# Patient Record
Sex: FEMALE | Race: BLACK OR AFRICAN AMERICAN | NOT HISPANIC OR LATINO
[De-identification: names, ages, dates, MRNs, and addresses within clinical notes are randomized per-mention and may not be internally consistent; named-entity substitution may affect disease eponyms.]

---

## 2017-04-20 ENCOUNTER — APPOINTMENT (OUTPATIENT)
Dept: INTERNAL MEDICINE | Facility: CLINIC | Age: 77
End: 2017-04-20
Payer: MEDICARE

## 2017-04-20 VITALS
OXYGEN SATURATION: 99 % | RESPIRATION RATE: 16 BRPM | HEART RATE: 69 BPM | DIASTOLIC BLOOD PRESSURE: 75 MMHG | TEMPERATURE: 98.4 F | HEIGHT: 63 IN | SYSTOLIC BLOOD PRESSURE: 141 MMHG

## 2017-04-20 PROCEDURE — 99214 OFFICE O/P EST MOD 30 MIN: CPT

## 2017-04-20 RX ORDER — AZITHROMYCIN 250 MG/1
250 TABLET, FILM COATED ORAL
Qty: 1 | Refills: 0 | Status: COMPLETED | COMMUNITY
Start: 2017-04-20 | End: 2017-04-28

## 2017-04-20 RX ORDER — ACETAMINOPHEN AND CODEINE 300; 30 MG/1; MG/1
300-30 TABLET ORAL
Qty: 7 | Refills: 0 | Status: COMPLETED | COMMUNITY
Start: 2016-12-08

## 2017-04-20 RX ORDER — DICLOFENAC SODIUM 75 MG/1
75 TABLET, DELAYED RELEASE ORAL
Qty: 14 | Refills: 0 | Status: COMPLETED | COMMUNITY
Start: 2016-12-08

## 2017-11-16 ENCOUNTER — APPOINTMENT (OUTPATIENT)
Dept: INTERNAL MEDICINE | Facility: CLINIC | Age: 77
End: 2017-11-16

## 2018-02-08 ENCOUNTER — APPOINTMENT (OUTPATIENT)
Dept: INTERNAL MEDICINE | Facility: CLINIC | Age: 78
End: 2018-02-08
Payer: MEDICARE

## 2018-02-08 ENCOUNTER — LABORATORY RESULT (OUTPATIENT)
Age: 78
End: 2018-02-08

## 2018-02-08 VITALS
WEIGHT: 160 LBS | DIASTOLIC BLOOD PRESSURE: 83 MMHG | HEIGHT: 63 IN | HEART RATE: 73 BPM | RESPIRATION RATE: 16 BRPM | TEMPERATURE: 98.2 F | SYSTOLIC BLOOD PRESSURE: 144 MMHG | BODY MASS INDEX: 28.35 KG/M2 | OXYGEN SATURATION: 99 %

## 2018-02-08 DIAGNOSIS — Z87.09 PERSONAL HISTORY OF OTHER DISEASES OF THE RESPIRATORY SYSTEM: ICD-10-CM

## 2018-02-08 DIAGNOSIS — M25.50 PAIN IN UNSPECIFIED JOINT: ICD-10-CM

## 2018-02-08 PROCEDURE — 99397 PER PM REEVAL EST PAT 65+ YR: CPT

## 2018-02-13 LAB
25(OH)D3 SERPL-MCNC: 27.7 NG/ML
ALBUMIN SERPL ELPH-MCNC: 4.3 G/DL
ALP BLD-CCNC: 90 U/L
ALT SERPL-CCNC: 24 U/L
ANION GAP SERPL CALC-SCNC: 16 MMOL/L
APPEARANCE: CLEAR
AST SERPL-CCNC: 21 U/L
BASOPHILS # BLD AUTO: 0.04 K/UL
BASOPHILS NFR BLD AUTO: 0.8 %
BILIRUB SERPL-MCNC: 0.4 MG/DL
BILIRUBIN URINE: NEGATIVE
BLOOD URINE: NEGATIVE
BUN SERPL-MCNC: 15 MG/DL
CALCIUM SERPL-MCNC: 9.6 MG/DL
CHLORIDE SERPL-SCNC: 104 MMOL/L
CHOLEST SERPL-MCNC: 235 MG/DL
CHOLEST/HDLC SERPL: 2.8 RATIO
CO2 SERPL-SCNC: 23 MMOL/L
COLOR: YELLOW
CREAT SERPL-MCNC: 0.71 MG/DL
EOSINOPHIL # BLD AUTO: 0.18 K/UL
EOSINOPHIL NFR BLD AUTO: 3.5 %
GLUCOSE QUALITATIVE U: NEGATIVE MG/DL
GLUCOSE SERPL-MCNC: 82 MG/DL
HBA1C MFR BLD HPLC: 5.2 %
HCT VFR BLD CALC: 46.3 %
HDLC SERPL-MCNC: 83 MG/DL
HGB BLD-MCNC: 14.5 G/DL
IMM GRANULOCYTES NFR BLD AUTO: 0.4 %
KETONES URINE: NEGATIVE
LDLC SERPL CALC-MCNC: 140 MG/DL
LEUKOCYTE ESTERASE URINE: ABNORMAL
LYMPHOCYTES # BLD AUTO: 1.57 K/UL
LYMPHOCYTES NFR BLD AUTO: 30.6 %
MAN DIFF?: NORMAL
MCHC RBC-ENTMCNC: 28 PG
MCHC RBC-ENTMCNC: 31.3 GM/DL
MCV RBC AUTO: 89.4 FL
MONOCYTES # BLD AUTO: 0.51 K/UL
MONOCYTES NFR BLD AUTO: 9.9 %
NEUTROPHILS # BLD AUTO: 2.81 K/UL
NEUTROPHILS NFR BLD AUTO: 54.8 %
NITRITE URINE: NEGATIVE
PH URINE: 8.5
PLATELET # BLD AUTO: 264 K/UL
POTASSIUM SERPL-SCNC: 4.2 MMOL/L
PROT SERPL-MCNC: 7.1 G/DL
PROTEIN URINE: NEGATIVE MG/DL
RBC # BLD: 5.18 M/UL
RBC # FLD: 12.3 %
SODIUM SERPL-SCNC: 143 MMOL/L
SPECIFIC GRAVITY URINE: 1.02
T4 FREE SERPL-MCNC: 1 NG/DL
THYROGLOB AB SERPL-ACNC: <20 IU/ML
THYROPEROXIDASE AB SERPL IA-ACNC: <10 IU/ML
TRIGL SERPL-MCNC: 58 MG/DL
TSH SERPL-ACNC: 4.34 UIU/ML
UROBILINOGEN URINE: NEGATIVE MG/DL
WBC # FLD AUTO: 5.13 K/UL

## 2018-02-28 ENCOUNTER — APPOINTMENT (OUTPATIENT)
Dept: INTERNAL MEDICINE | Facility: CLINIC | Age: 78
End: 2018-02-28
Payer: MEDICARE

## 2018-02-28 VITALS
RESPIRATION RATE: 16 BRPM | HEART RATE: 76 BPM | DIASTOLIC BLOOD PRESSURE: 87 MMHG | TEMPERATURE: 98.6 F | HEIGHT: 63 IN | SYSTOLIC BLOOD PRESSURE: 147 MMHG | OXYGEN SATURATION: 97 %

## 2018-02-28 DIAGNOSIS — Z87.09 PERSONAL HISTORY OF OTHER DISEASES OF THE RESPIRATORY SYSTEM: ICD-10-CM

## 2018-02-28 LAB — RESULT: NEGATIVE

## 2018-02-28 PROCEDURE — 87070 CULTURE OTHR SPECIMN AEROBIC: CPT

## 2018-02-28 PROCEDURE — 99214 OFFICE O/P EST MOD 30 MIN: CPT

## 2018-03-16 ENCOUNTER — APPOINTMENT (OUTPATIENT)
Dept: ORTHOPEDIC SURGERY | Facility: CLINIC | Age: 78
End: 2018-03-16

## 2018-04-20 ENCOUNTER — APPOINTMENT (OUTPATIENT)
Dept: ORTHOPEDIC SURGERY | Facility: CLINIC | Age: 78
End: 2018-04-20
Payer: MEDICARE

## 2018-04-20 VITALS
SYSTOLIC BLOOD PRESSURE: 147 MMHG | DIASTOLIC BLOOD PRESSURE: 100 MMHG | HEIGHT: 63 IN | WEIGHT: 160 LBS | HEART RATE: 69 BPM | BODY MASS INDEX: 28.35 KG/M2

## 2018-04-20 DIAGNOSIS — M17.10 UNILATERAL PRIMARY OSTEOARTHRITIS, UNSPECIFIED KNEE: ICD-10-CM

## 2018-04-20 DIAGNOSIS — Z78.9 OTHER SPECIFIED HEALTH STATUS: ICD-10-CM

## 2018-04-20 DIAGNOSIS — Z87.09 PERSONAL HISTORY OF OTHER DISEASES OF THE RESPIRATORY SYSTEM: ICD-10-CM

## 2018-04-20 DIAGNOSIS — M16.11 UNILATERAL PRIMARY OSTEOARTHRITIS, RIGHT HIP: ICD-10-CM

## 2018-04-20 DIAGNOSIS — M17.11 UNILATERAL PRIMARY OSTEOARTHRITIS, RIGHT KNEE: ICD-10-CM

## 2018-04-20 PROCEDURE — 20610 DRAIN/INJ JOINT/BURSA W/O US: CPT | Mod: RT

## 2018-04-20 PROCEDURE — 99203 OFFICE O/P NEW LOW 30 MIN: CPT | Mod: 25

## 2018-04-20 PROCEDURE — 73564 X-RAY EXAM KNEE 4 OR MORE: CPT | Mod: RT

## 2018-04-20 PROCEDURE — 73502 X-RAY EXAM HIP UNI 2-3 VIEWS: CPT | Mod: RT

## 2018-04-25 ENCOUNTER — APPOINTMENT (OUTPATIENT)
Dept: INTERNAL MEDICINE | Facility: CLINIC | Age: 78
End: 2018-04-25

## 2018-06-27 ENCOUNTER — APPOINTMENT (OUTPATIENT)
Dept: ORTHOPEDIC SURGERY | Facility: CLINIC | Age: 78
End: 2018-06-27
Payer: MEDICARE

## 2018-06-27 VITALS
HEIGHT: 63 IN | BODY MASS INDEX: 28.35 KG/M2 | SYSTOLIC BLOOD PRESSURE: 155 MMHG | DIASTOLIC BLOOD PRESSURE: 75 MMHG | WEIGHT: 160 LBS | HEART RATE: 86 BPM

## 2018-06-27 PROCEDURE — 99213 OFFICE O/P EST LOW 20 MIN: CPT

## 2018-06-28 ENCOUNTER — APPOINTMENT (OUTPATIENT)
Dept: RADIOLOGY | Facility: CLINIC | Age: 78
End: 2018-06-28

## 2018-06-28 ENCOUNTER — APPOINTMENT (OUTPATIENT)
Dept: INTERNAL MEDICINE | Facility: CLINIC | Age: 78
End: 2018-06-28
Payer: MEDICARE

## 2018-06-28 VITALS
OXYGEN SATURATION: 98 % | WEIGHT: 156 LBS | SYSTOLIC BLOOD PRESSURE: 151 MMHG | BODY MASS INDEX: 27.64 KG/M2 | HEART RATE: 75 BPM | RESPIRATION RATE: 16 BRPM | TEMPERATURE: 98.3 F | DIASTOLIC BLOOD PRESSURE: 83 MMHG | HEIGHT: 63 IN

## 2018-06-28 DIAGNOSIS — Z86.69 PERSONAL HISTORY OF OTHER DISEASES OF THE NERVOUS SYSTEM AND SENSE ORGANS: ICD-10-CM

## 2018-06-28 DIAGNOSIS — M79.673 PAIN IN UNSPECIFIED FOOT: ICD-10-CM

## 2018-06-28 DIAGNOSIS — Z87.09 PERSONAL HISTORY OF OTHER DISEASES OF THE RESPIRATORY SYSTEM: ICD-10-CM

## 2018-06-28 DIAGNOSIS — Z87.898 PERSONAL HISTORY OF OTHER SPECIFIED CONDITIONS: ICD-10-CM

## 2018-06-28 PROCEDURE — 99214 OFFICE O/P EST MOD 30 MIN: CPT

## 2018-06-28 RX ORDER — AMOXICILLIN AND CLAVULANATE POTASSIUM 500; 125 MG/1; MG/1
500-125 TABLET, FILM COATED ORAL TWICE DAILY
Qty: 14 | Refills: 0 | Status: DISCONTINUED | COMMUNITY
Start: 2018-02-28 | End: 2018-06-28

## 2018-06-28 RX ORDER — AMOXICILLIN AND CLAVULANATE POTASSIUM 875; 125 MG/1; MG/1
875-125 TABLET, COATED ORAL
Qty: 20 | Refills: 0 | Status: DISCONTINUED | COMMUNITY
Start: 2018-02-27 | End: 2018-06-28

## 2018-07-27 ENCOUNTER — APPOINTMENT (OUTPATIENT)
Dept: ORTHOPEDIC SURGERY | Facility: CLINIC | Age: 78
End: 2018-07-27
Payer: MEDICARE

## 2018-07-27 DIAGNOSIS — M17.0 BILATERAL PRIMARY OSTEOARTHRITIS OF KNEE: ICD-10-CM

## 2018-07-27 PROCEDURE — 99213 OFFICE O/P EST LOW 20 MIN: CPT

## 2018-10-26 ENCOUNTER — APPOINTMENT (OUTPATIENT)
Dept: INTERNAL MEDICINE | Facility: CLINIC | Age: 78
End: 2018-10-26
Payer: MEDICARE

## 2018-10-26 VITALS
HEART RATE: 83 BPM | RESPIRATION RATE: 16 BRPM | DIASTOLIC BLOOD PRESSURE: 77 MMHG | OXYGEN SATURATION: 98 % | WEIGHT: 162 LBS | BODY MASS INDEX: 28.7 KG/M2 | HEIGHT: 63 IN | TEMPERATURE: 98.1 F | SYSTOLIC BLOOD PRESSURE: 139 MMHG

## 2018-10-26 DIAGNOSIS — J45.909 UNSPECIFIED ASTHMA, UNCOMPLICATED: ICD-10-CM

## 2018-10-26 PROCEDURE — 99213 OFFICE O/P EST LOW 20 MIN: CPT

## 2018-10-26 RX ORDER — CELECOXIB 200 MG/1
200 CAPSULE ORAL
Qty: 14 | Refills: 0 | Status: DISCONTINUED | COMMUNITY
Start: 2018-03-20 | End: 2018-10-26

## 2018-11-03 ENCOUNTER — RESULT REVIEW (OUTPATIENT)
Age: 78
End: 2018-11-03

## 2018-11-03 DIAGNOSIS — R94.6 ABNORMAL RESULTS OF THYROID FUNCTION STUDIES: ICD-10-CM

## 2019-02-27 ENCOUNTER — APPOINTMENT (OUTPATIENT)
Dept: UROGYNECOLOGY | Facility: CLINIC | Age: 79
End: 2019-02-27
Payer: MEDICARE

## 2019-02-27 VITALS
DIASTOLIC BLOOD PRESSURE: 90 MMHG | BODY MASS INDEX: 29.44 KG/M2 | SYSTOLIC BLOOD PRESSURE: 150 MMHG | WEIGHT: 160 LBS | HEIGHT: 62 IN

## 2019-02-27 DIAGNOSIS — N32.2 VESICAL FISTULA, NOT ELSEWHERE CLASSIFIED: ICD-10-CM

## 2019-02-27 DIAGNOSIS — R35.0 FREQUENCY OF MICTURITION: ICD-10-CM

## 2019-02-27 DIAGNOSIS — N39.42 INCONTINENCE W/OUT SENSORY AWARENESS: ICD-10-CM

## 2019-02-27 DIAGNOSIS — R26.89 OTHER ABNORMALITIES OF GAIT AND MOBILITY: ICD-10-CM

## 2019-02-27 LAB
BILIRUB UR QL STRIP: NORMAL
CLARITY UR: CLEAR
COLLECTION METHOD: NORMAL
GLUCOSE UR-MCNC: NORMAL
HCG UR QL: 0.2 EU/DL
HGB UR QL STRIP.AUTO: NORMAL
KETONES UR-MCNC: NORMAL
LEUKOCYTE ESTERASE UR QL STRIP: NORMAL
NITRITE UR QL STRIP: NORMAL
PH UR STRIP: 6.5
PROT UR STRIP-MCNC: NORMAL
SP GR UR STRIP: 1.01

## 2019-02-27 PROCEDURE — 51701 INSERT BLADDER CATHETER: CPT

## 2019-02-27 PROCEDURE — 99204 OFFICE O/P NEW MOD 45 MIN: CPT | Mod: 25

## 2019-02-27 NOTE — HISTORY OF PRESENT ILLNESS
[Constipation Obstructed Defecation] : none [Stool Visible Blood] : none [Diarrhea] : none [de-identified] : vaginal spotting, vaginal discharge [FreeTextEntry1] : Patient s/p vaginal hysterectomy for prolapse about 3 years ago had persistent bleeding  which first started 3 months ago. 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 Symptoms are vaginal spotting

## 2019-02-27 NOTE — LETTER BODY
[Dear  ___] : Dear  [unfilled], [Attached please find my note.] : Attached please find my note. [Thank you very much for allowing me to participate in the care of this patient. If you have any questions, please do not hesitate to contact me] : Thank you very much for allowing me to participate in the care of this patient. If you have any questions, please do not hesitate to contact me. [DrJean  ___] : Dr. SWIFT

## 2019-02-27 NOTE — ASSESSMENT
[FreeTextEntry1] : Patient s/p vaginal hysterectomy for prolapse about 3 years ago had persistent bleeding  which first started 3 months ago. 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 Symptoms are vaginal spotting.\par \par Exam notable for 1 cm defect at vaginal apex 9-10 cm proximal to introitus.  Symptoms are only vaginal bleeding although fecal material is apparent.\par \par Defect is too high for local repair.\par \par Plan refer to Dr Hood for assessment of relative need for repair as well as counseling for possible bowel resection and repair. This defect would need to be addressed by colo-reactal surgery.  The only gyn role would be to close the small vaginal defect which typically does not need gyn assistance.

## 2019-02-28 ENCOUNTER — RESULT REVIEW (OUTPATIENT)
Age: 79
End: 2019-02-28

## 2019-02-28 LAB
APPEARANCE: CLEAR
BACTERIA: NEGATIVE
BILIRUBIN URINE: NEGATIVE
BLOOD URINE: NEGATIVE
COLOR: NORMAL
GLUCOSE QUALITATIVE U: NEGATIVE
HYALINE CASTS: 0 /LPF
KETONES URINE: NEGATIVE
LEUKOCYTE ESTERASE URINE: NEGATIVE
MICROSCOPIC-UA: NORMAL
NITRITE URINE: NEGATIVE
PH URINE: 6.5
PROTEIN URINE: NEGATIVE
RED BLOOD CELLS URINE: 1 /HPF
SPECIFIC GRAVITY URINE: 1.01
SQUAMOUS EPITHELIAL CELLS: 2 /HPF
UROBILINOGEN URINE: NORMAL
WHITE BLOOD CELLS URINE: 1 /HPF

## 2019-03-04 ENCOUNTER — RESULT REVIEW (OUTPATIENT)
Age: 79
End: 2019-03-04

## 2019-03-07 ENCOUNTER — APPOINTMENT (OUTPATIENT)
Dept: INTERNAL MEDICINE | Facility: CLINIC | Age: 79
End: 2019-03-07

## 2019-04-05 ENCOUNTER — APPOINTMENT (OUTPATIENT)
Dept: COLORECTAL SURGERY | Facility: CLINIC | Age: 79
End: 2019-04-05
Payer: MEDICARE

## 2019-04-05 VITALS
DIASTOLIC BLOOD PRESSURE: 70 MMHG | HEART RATE: 70 BPM | SYSTOLIC BLOOD PRESSURE: 130 MMHG | HEIGHT: 63 IN | RESPIRATION RATE: 12 BRPM | BODY MASS INDEX: 27.11 KG/M2 | WEIGHT: 153 LBS

## 2019-04-05 PROCEDURE — 99204 OFFICE O/P NEW MOD 45 MIN: CPT

## 2019-04-05 RX ORDER — LEVOTHYROXINE SODIUM 25 UG/1
25 TABLET ORAL DAILY
Qty: 30 | Refills: 3 | Status: DISCONTINUED | COMMUNITY
Start: 2018-11-03 | End: 2019-04-05

## 2019-04-08 NOTE — ASSESSMENT
[FreeTextEntry1] : 78-year-old [facility] female who presents for consultation regarding management of her rectovaginal fistula.\par \par Patient had a vaginal hysterectomy for prolapse approximately 3 years ago. One year ago she started to note the passage of flatus per vagina. In October of 2018 a small amount of blood was passed per vagina. This prompted referral to gynecology. Physical examination revealed what appeared to be a defect at the apex of the vagina and stool in the vagina.  An MRI of the pelvis confirmed the presence of a rectovaginal fistula at approximately the 9-10 cm level. Patient's last colonoscopy was many years ago.\par \par She is otherwise healthy and her only past abdominal surgery includes a tubal ligation through a small lower midline incision.\par \par I had a long discussion with the patient and her daughter who accompanied her regarding my suggestion for the repair of this fistula. This requires an operative approach to take down and repair the rectovaginal fistula with an anterior resection being performed. This will likely be a low colorectal anastomosis and there is a good possibility that she may require a temporary loop ileostomy. We discussed all aspects of both operations including anticipated operative time, hospital stay and time to full recuperation. Risks, alternatives and benefits including but not limited to anastomotic leak, wound infection and deep venous thrombosis were discussed. I also mentioned to her the need for a sigmoidoscopy and a Hypaque enema to assess the colorectal anastomosis prior to loop ileostomy closure. Generally the loop ileostomy is closed 12 weeks after its construction.\par \par Questions were answered and they will get back to md if they wish to proceed with my suggestion.

## 2019-04-08 NOTE — REVIEW OF SYSTEMS
[Joint Pain] : joint pain [Negative] : Endocrine [Chest Pain] : no chest pain [Shortness Of Breath] : no shortness of breath [Easy Bleeding] : no tendency for easy bleeding [Easy Bruising] : no tendency for easy bruising

## 2019-04-08 NOTE — HISTORY OF PRESENT ILLNESS
[FreeTextEntry1] : 77yo Ethiopian female s/p supracervical hysterectomy 3yrs ago secondary to uterine prolapse.\par \par Over the past year pt noticed air via vagina. Since Oct 2018 pt began experiencing "minimal" intermittent vaginal spotting.  Seen by GYN. "Abnormality noted on pelvic exam".  Pelvic MRI performed revealing fistula from vagina to upper rectum.  Also pt with recent UTI. Presents for consultation.\par \par Initial screening colonoscopy @5yrs ago.  Polyps removed.

## 2019-04-08 NOTE — PHYSICAL EXAM
[Normal Breath Sounds] : Normal breath sounds [Normal Heart Sounds] : normal heart sounds [Normal Rate and Rhythm] : normal rate and rhythm [No Edema] : No edema [Alert] : alert [Oriented to Person] : oriented to person [Oriented to Place] : oriented to place [Oriented to Time] : oriented to time [Calm] : calm [de-identified] : obese +BS NT/ND low midline incision [de-identified] : NC/AT [de-identified] : +ROM +kyphosis [de-identified] : intact

## 2019-05-16 ENCOUNTER — MEDICATION RENEWAL (OUTPATIENT)
Age: 79
End: 2019-05-16

## 2019-06-26 ENCOUNTER — FORM ENCOUNTER (OUTPATIENT)
Age: 79
End: 2019-06-26

## 2019-06-27 ENCOUNTER — APPOINTMENT (OUTPATIENT)
Dept: MAMMOGRAPHY | Facility: CLINIC | Age: 79
End: 2019-06-27
Payer: MEDICARE

## 2019-06-27 ENCOUNTER — OUTPATIENT (OUTPATIENT)
Dept: OUTPATIENT SERVICES | Facility: HOSPITAL | Age: 79
LOS: 1 days | End: 2019-06-27
Payer: MEDICARE

## 2019-06-27 DIAGNOSIS — Z00.00 ENCOUNTER FOR GENERAL ADULT MEDICAL EXAMINATION WITHOUT ABNORMAL FINDINGS: ICD-10-CM

## 2019-06-27 PROCEDURE — 77063 BREAST TOMOSYNTHESIS BI: CPT | Mod: 26

## 2019-06-27 PROCEDURE — 77067 SCR MAMMO BI INCL CAD: CPT | Mod: 26

## 2019-06-27 PROCEDURE — 77063 BREAST TOMOSYNTHESIS BI: CPT

## 2019-06-27 PROCEDURE — 77067 SCR MAMMO BI INCL CAD: CPT

## 2019-07-03 ENCOUNTER — MESSAGE (OUTPATIENT)
Age: 79
End: 2019-07-03

## 2019-07-12 ENCOUNTER — MESSAGE (OUTPATIENT)
Age: 79
End: 2019-07-12

## 2019-07-18 ENCOUNTER — RESULT REVIEW (OUTPATIENT)
Age: 79
End: 2019-07-18

## 2019-07-25 ENCOUNTER — APPOINTMENT (OUTPATIENT)
Dept: INTERNAL MEDICINE | Facility: CLINIC | Age: 79
End: 2019-07-25
Payer: MEDICARE

## 2019-07-25 VITALS
BODY MASS INDEX: 27.29 KG/M2 | SYSTOLIC BLOOD PRESSURE: 128 MMHG | HEART RATE: 72 BPM | DIASTOLIC BLOOD PRESSURE: 88 MMHG | RESPIRATION RATE: 15 BRPM | WEIGHT: 154 LBS | OXYGEN SATURATION: 97 % | TEMPERATURE: 98 F | HEIGHT: 63 IN

## 2019-07-25 DIAGNOSIS — Z12.11 ENCOUNTER FOR SCREENING FOR MALIGNANT NEOPLASM OF COLON: ICD-10-CM

## 2019-07-25 DIAGNOSIS — R92.8 OTHER ABNORMAL AND INCONCLUSIVE FINDINGS ON DIAGNOSTIC IMAGING OF BREAST: ICD-10-CM

## 2019-07-25 DIAGNOSIS — S92.911A UNSPECIFIED FRACTURE OF RIGHT TOE(S), INITIAL ENCOUNTER FOR CLOSED FRACTURE: ICD-10-CM

## 2019-07-25 PROCEDURE — 99397 PER PM REEVAL EST PAT 65+ YR: CPT

## 2019-07-25 RX ORDER — SULFAMETHOXAZOLE AND TRIMETHOPRIM 800; 160 MG/1; MG/1
800-160 TABLET ORAL TWICE DAILY
Qty: 6 | Refills: 0 | Status: DISCONTINUED | COMMUNITY
Start: 2019-03-04 | End: 2019-07-25

## 2019-07-30 LAB
25(OH)D3 SERPL-MCNC: 23.1 NG/ML
ALBUMIN SERPL ELPH-MCNC: 4.4 G/DL
ALP BLD-CCNC: 88 U/L
ALT SERPL-CCNC: 15 U/L
ANION GAP SERPL CALC-SCNC: 13 MMOL/L
AST SERPL-CCNC: 19 U/L
BASOPHILS # BLD AUTO: 0.04 K/UL
BASOPHILS NFR BLD AUTO: 0.7 %
BILIRUB SERPL-MCNC: 0.5 MG/DL
BUN SERPL-MCNC: 13 MG/DL
CALCIUM SERPL-MCNC: 9.9 MG/DL
CHLORIDE SERPL-SCNC: 104 MMOL/L
CHOLEST SERPL-MCNC: 216 MG/DL
CHOLEST/HDLC SERPL: 2.8 RATIO
CO2 SERPL-SCNC: 26 MMOL/L
CREAT SERPL-MCNC: 0.66 MG/DL
EOSINOPHIL # BLD AUTO: 0.23 K/UL
EOSINOPHIL NFR BLD AUTO: 3.8 %
ESTIMATED AVERAGE GLUCOSE: 97 MG/DL
GLUCOSE SERPL-MCNC: 76 MG/DL
HBA1C MFR BLD HPLC: 5 %
HCT VFR BLD CALC: 44.3 %
HDLC SERPL-MCNC: 76 MG/DL
HGB BLD-MCNC: 13.4 G/DL
IMM GRANULOCYTES NFR BLD AUTO: 0.2 %
LDLC SERPL CALC-MCNC: 129 MG/DL
LYMPHOCYTES # BLD AUTO: 1.85 K/UL
LYMPHOCYTES NFR BLD AUTO: 30.7 %
MAN DIFF?: NORMAL
MCHC RBC-ENTMCNC: 27.6 PG
MCHC RBC-ENTMCNC: 30.2 GM/DL
MCV RBC AUTO: 91.2 FL
MONOCYTES # BLD AUTO: 0.52 K/UL
MONOCYTES NFR BLD AUTO: 8.6 %
NEUTROPHILS # BLD AUTO: 3.38 K/UL
NEUTROPHILS NFR BLD AUTO: 56 %
PLATELET # BLD AUTO: 268 K/UL
POTASSIUM SERPL-SCNC: 4.5 MMOL/L
PROT SERPL-MCNC: 6.8 G/DL
RBC # BLD: 4.86 M/UL
RBC # FLD: 12.7 %
SODIUM SERPL-SCNC: 143 MMOL/L
T4 FREE SERPL-MCNC: 0.9 NG/DL
TRIGL SERPL-MCNC: 54 MG/DL
TSH SERPL-ACNC: 3.45 UIU/ML
WBC # FLD AUTO: 6.03 K/UL

## 2019-08-14 ENCOUNTER — FORM ENCOUNTER (OUTPATIENT)
Age: 79
End: 2019-08-14

## 2019-08-15 ENCOUNTER — OUTPATIENT (OUTPATIENT)
Dept: OUTPATIENT SERVICES | Facility: HOSPITAL | Age: 79
LOS: 1 days | End: 2019-08-15
Payer: MEDICARE

## 2019-08-15 ENCOUNTER — APPOINTMENT (OUTPATIENT)
Dept: ULTRASOUND IMAGING | Facility: CLINIC | Age: 79
End: 2019-08-15
Payer: MEDICARE

## 2019-08-15 DIAGNOSIS — Z00.8 ENCOUNTER FOR OTHER GENERAL EXAMINATION: ICD-10-CM

## 2019-08-15 PROCEDURE — 76642 ULTRASOUND BREAST LIMITED: CPT

## 2019-08-15 PROCEDURE — 76642 ULTRASOUND BREAST LIMITED: CPT | Mod: 26,LT

## 2019-11-21 ENCOUNTER — APPOINTMENT (OUTPATIENT)
Dept: UROGYNECOLOGY | Facility: CLINIC | Age: 79
End: 2019-11-21
Payer: MEDICARE

## 2019-11-21 DIAGNOSIS — N82.3 FISTULA OF VAGINA TO LARGE INTESTINE: ICD-10-CM

## 2019-11-21 LAB — BACTERIA UR CULT: ABNORMAL

## 2019-11-21 PROCEDURE — 99214 OFFICE O/P EST MOD 30 MIN: CPT

## 2019-11-21 NOTE — HISTORY OF PRESENT ILLNESS
[FreeTextEntry1] : This is a 79-year-old with a documented enterovaginal fistula has already been assessed by Dr. Dorman she known counseled regarding possible surgery including anterior section and necessary repairs with possible colostomy necessary. She came today for my opinion. I did explain to her that the vagina is an outlet for fistulous process which as is his primary tumor surgery on the bowel. The vagina is in active pathway for the stool which is protected for her body. At the conclusion of the procedure Dr. Bhakta she notes a small vaginal and if he felt necessary to have q.n. surgery will of course make our team available.  She is aware that I do not have the expertise to  her regarding expectations for the surgery and she will have further preoperative counseling with Dr. Leal.\par \par \par She does not have any symptoms of a UTI but asked if I could check her urine. As her screw stool at the vagina I cleansed as well as I could but I felt that even with proper technique and a Tesio catheter was more likely to introduce bacteria and help us in obtaining a specimen. He indicated that she has frequency or dysuria he would make sense to have a specimen in the absence of symptoms I thought that catheterization was best not done. He does endorse Dr. bower reputation and surgical scale and helped her to feel reassured that she is in good hands.\par \par Counseling was greater than 50 percent of encounter which included  26   minute face to face time for direct counseling.\par

## 2020-12-16 PROBLEM — Z87.09 HISTORY OF SORE THROAT: Status: RESOLVED | Noted: 2018-02-28 | Resolved: 2020-12-16

## 2021-01-01 ENCOUNTER — APPOINTMENT (OUTPATIENT)
Dept: INTERNAL MEDICINE | Facility: CLINIC | Age: 81
End: 2021-01-01

## 2021-01-14 ENCOUNTER — APPOINTMENT (OUTPATIENT)
Dept: INTERNAL MEDICINE | Facility: CLINIC | Age: 81
End: 2021-01-14
Payer: MEDICARE

## 2021-01-14 VITALS
TEMPERATURE: 98.1 F | DIASTOLIC BLOOD PRESSURE: 82 MMHG | HEIGHT: 63 IN | BODY MASS INDEX: 26.58 KG/M2 | SYSTOLIC BLOOD PRESSURE: 156 MMHG | RESPIRATION RATE: 17 BRPM | WEIGHT: 150 LBS | OXYGEN SATURATION: 100 % | HEART RATE: 88 BPM

## 2021-01-14 DIAGNOSIS — E55.9 VITAMIN D DEFICIENCY, UNSPECIFIED: ICD-10-CM

## 2021-01-14 DIAGNOSIS — E78.5 HYPERLIPIDEMIA, UNSPECIFIED: ICD-10-CM

## 2021-01-14 DIAGNOSIS — Z01.818 ENCOUNTER FOR OTHER PREPROCEDURAL EXAMINATION: ICD-10-CM

## 2021-01-14 DIAGNOSIS — Z00.00 ENCOUNTER FOR GENERAL ADULT MEDICAL EXAMINATION W/OUT ABNORMAL FINDINGS: ICD-10-CM

## 2021-01-14 DIAGNOSIS — Z87.898 PERSONAL HISTORY OF OTHER SPECIFIED CONDITIONS: ICD-10-CM

## 2021-01-14 DIAGNOSIS — R53.83 OTHER FATIGUE: ICD-10-CM

## 2021-01-14 DIAGNOSIS — M54.9 DORSALGIA, UNSPECIFIED: ICD-10-CM

## 2021-01-14 DIAGNOSIS — I10 ESSENTIAL (PRIMARY) HYPERTENSION: ICD-10-CM

## 2021-01-14 DIAGNOSIS — M54.2 CERVICALGIA: ICD-10-CM

## 2021-01-14 DIAGNOSIS — R92.2 INCONCLUSIVE MAMMOGRAM: ICD-10-CM

## 2021-01-14 PROCEDURE — G0439: CPT

## 2021-01-14 PROCEDURE — 99072 ADDL SUPL MATRL&STAF TM PHE: CPT

## 2021-01-14 RX ORDER — ERGOCALCIFEROL 1.25 MG/1
1.25 MG CAPSULE, LIQUID FILLED ORAL
Qty: 12 | Refills: 1 | Status: ACTIVE | COMMUNITY
Start: 2021-01-14 | End: 1900-01-01

## 2021-01-14 RX ORDER — AMLODIPINE BESYLATE 2.5 MG/1
2.5 TABLET ORAL DAILY
Qty: 90 | Refills: 1 | Status: ACTIVE | COMMUNITY
Start: 2021-01-14 | End: 1900-01-01

## 2021-01-21 LAB
25(OH)D3 SERPL-MCNC: 29 NG/ML
ALBUMIN SERPL ELPH-MCNC: 4.4 G/DL
ALP BLD-CCNC: 97 U/L
ALT SERPL-CCNC: 17 U/L
ANION GAP SERPL CALC-SCNC: 13 MMOL/L
AST SERPL-CCNC: 17 U/L
BASOPHILS # BLD AUTO: 0.05 K/UL
BASOPHILS NFR BLD AUTO: 0.7 %
BILIRUB SERPL-MCNC: 0.4 MG/DL
BUN SERPL-MCNC: 16 MG/DL
CALCIUM SERPL-MCNC: 10.3 MG/DL
CHLORIDE SERPL-SCNC: 102 MMOL/L
CHOLEST SERPL-MCNC: 232 MG/DL
CO2 SERPL-SCNC: 26 MMOL/L
CREAT SERPL-MCNC: 0.79 MG/DL
EOSINOPHIL # BLD AUTO: 0.2 K/UL
EOSINOPHIL NFR BLD AUTO: 3 %
ESTIMATED AVERAGE GLUCOSE: 97 MG/DL
GLUCOSE SERPL-MCNC: 83 MG/DL
HBA1C MFR BLD HPLC: 5 %
HCT VFR BLD CALC: 46 %
HDLC SERPL-MCNC: 74 MG/DL
HGB BLD-MCNC: 14.1 G/DL
IMM GRANULOCYTES NFR BLD AUTO: 0.3 %
LDLC SERPL CALC-MCNC: 144 MG/DL
LYMPHOCYTES # BLD AUTO: 1.67 K/UL
LYMPHOCYTES NFR BLD AUTO: 25 %
MAN DIFF?: NORMAL
MCHC RBC-ENTMCNC: 27.9 PG
MCHC RBC-ENTMCNC: 30.7 GM/DL
MCV RBC AUTO: 90.9 FL
MONOCYTES # BLD AUTO: 0.63 K/UL
MONOCYTES NFR BLD AUTO: 9.4 %
NEUTROPHILS # BLD AUTO: 4.11 K/UL
NEUTROPHILS NFR BLD AUTO: 61.6 %
NONHDLC SERPL-MCNC: 158 MG/DL
PLATELET # BLD AUTO: 287 K/UL
POTASSIUM SERPL-SCNC: 4.4 MMOL/L
PROT SERPL-MCNC: 7 G/DL
RBC # BLD: 5.06 M/UL
RBC # FLD: 12 %
SODIUM SERPL-SCNC: 141 MMOL/L
TRIGL SERPL-MCNC: 71 MG/DL
TSH SERPL-ACNC: 5.68 UIU/ML
WBC # FLD AUTO: 6.68 K/UL

## 2022-01-01 ENCOUNTER — INPATIENT (INPATIENT)
Facility: HOSPITAL | Age: 82
LOS: 2 days | Discharge: LEFT BEFORE DISCHARGE | DRG: 299 | End: 2022-02-20
Attending: STUDENT IN AN ORGANIZED HEALTH CARE EDUCATION/TRAINING PROGRAM | Admitting: HOSPITALIST
Payer: MEDICARE

## 2022-01-01 ENCOUNTER — TRANSCRIPTION ENCOUNTER (OUTPATIENT)
Age: 82
End: 2022-01-01

## 2022-01-01 ENCOUNTER — INPATIENT (INPATIENT)
Facility: HOSPITAL | Age: 82
LOS: 0 days | Discharge: EXPIRED | End: 2022-03-13
Attending: STUDENT IN AN ORGANIZED HEALTH CARE EDUCATION/TRAINING PROGRAM | Admitting: STUDENT IN AN ORGANIZED HEALTH CARE EDUCATION/TRAINING PROGRAM
Payer: MEDICARE

## 2022-01-01 VITALS
OXYGEN SATURATION: 100 % | DIASTOLIC BLOOD PRESSURE: 68 MMHG | SYSTOLIC BLOOD PRESSURE: 124 MMHG | HEART RATE: 101 BPM | RESPIRATION RATE: 18 BRPM | TEMPERATURE: 97 F

## 2022-01-01 VITALS
WEIGHT: 139.99 LBS | HEART RATE: 91 BPM | RESPIRATION RATE: 20 BRPM | TEMPERATURE: 97 F | OXYGEN SATURATION: 100 % | HEIGHT: 64 IN | DIASTOLIC BLOOD PRESSURE: 88 MMHG | SYSTOLIC BLOOD PRESSURE: 166 MMHG

## 2022-01-01 VITALS
HEART RATE: 55 BPM | RESPIRATION RATE: 22 BRPM | HEIGHT: 64 IN | DIASTOLIC BLOOD PRESSURE: 55 MMHG | SYSTOLIC BLOOD PRESSURE: 78 MMHG

## 2022-01-01 VITALS — DIASTOLIC BLOOD PRESSURE: 16 MMHG | RESPIRATION RATE: 26 BRPM | SYSTOLIC BLOOD PRESSURE: 38 MMHG | HEART RATE: 58 BPM

## 2022-01-01 DIAGNOSIS — R65.10 SYSTEMIC INFLAMMATORY RESPONSE SYNDROME (SIRS) OF NON-INFECTIOUS ORIGIN WITHOUT ACUTE ORGAN DYSFUNCTION: ICD-10-CM

## 2022-01-01 DIAGNOSIS — Z29.9 ENCOUNTER FOR PROPHYLACTIC MEASURES, UNSPECIFIED: ICD-10-CM

## 2022-01-01 DIAGNOSIS — R00.0 TACHYCARDIA, UNSPECIFIED: ICD-10-CM

## 2022-01-01 DIAGNOSIS — R06.02 SHORTNESS OF BREATH: ICD-10-CM

## 2022-01-01 DIAGNOSIS — R91.8 OTHER NONSPECIFIC ABNORMAL FINDING OF LUNG FIELD: ICD-10-CM

## 2022-01-01 DIAGNOSIS — N17.9 ACUTE KIDNEY FAILURE, UNSPECIFIED: ICD-10-CM

## 2022-01-01 DIAGNOSIS — D64.9 ANEMIA, UNSPECIFIED: ICD-10-CM

## 2022-01-01 DIAGNOSIS — Z90.710 ACQUIRED ABSENCE OF BOTH CERVIX AND UTERUS: Chronic | ICD-10-CM

## 2022-01-01 DIAGNOSIS — M79.89 OTHER SPECIFIED SOFT TISSUE DISORDERS: ICD-10-CM

## 2022-01-01 DIAGNOSIS — N82.3 FISTULA OF VAGINA TO LARGE INTESTINE: ICD-10-CM

## 2022-01-01 DIAGNOSIS — I82.409 ACUTE EMBOLISM AND THROMBOSIS OF UNSPECIFIED DEEP VEINS OF UNSPECIFIED LOWER EXTREMITY: ICD-10-CM

## 2022-01-01 DIAGNOSIS — R79.89 OTHER SPECIFIED ABNORMAL FINDINGS OF BLOOD CHEMISTRY: ICD-10-CM

## 2022-01-01 LAB
ALBUMIN SERPL ELPH-MCNC: 2 G/DL — LOW (ref 3.3–5)
ALBUMIN SERPL ELPH-MCNC: 2.4 G/DL — LOW (ref 3.3–5)
ALBUMIN SERPL ELPH-MCNC: 2.4 G/DL — LOW (ref 3.3–5)
ALBUMIN SERPL ELPH-MCNC: 2.6 G/DL — LOW (ref 3.3–5)
ALBUMIN SERPL ELPH-MCNC: 3 G/DL — LOW (ref 3.3–5)
ALP SERPL-CCNC: 108 U/L — SIGNIFICANT CHANGE UP (ref 40–120)
ALP SERPL-CCNC: 72 U/L — SIGNIFICANT CHANGE UP (ref 40–120)
ALP SERPL-CCNC: 74 U/L — SIGNIFICANT CHANGE UP (ref 40–120)
ALP SERPL-CCNC: 76 U/L — SIGNIFICANT CHANGE UP (ref 40–120)
ALP SERPL-CCNC: 90 U/L — SIGNIFICANT CHANGE UP (ref 40–120)
ALT FLD-CCNC: 28 U/L — SIGNIFICANT CHANGE UP (ref 10–45)
ALT FLD-CCNC: 29 U/L — SIGNIFICANT CHANGE UP (ref 10–45)
ALT FLD-CCNC: 30 U/L — SIGNIFICANT CHANGE UP (ref 10–45)
ALT FLD-CCNC: 32 U/L — SIGNIFICANT CHANGE UP (ref 10–45)
ALT FLD-CCNC: 56 U/L — HIGH (ref 4–33)
ANION GAP SERPL CALC-SCNC: 12 MMOL/L — SIGNIFICANT CHANGE UP (ref 5–17)
ANION GAP SERPL CALC-SCNC: 13 MMOL/L — SIGNIFICANT CHANGE UP (ref 5–17)
ANION GAP SERPL CALC-SCNC: 14 MMOL/L — SIGNIFICANT CHANGE UP (ref 5–17)
ANION GAP SERPL CALC-SCNC: 14 MMOL/L — SIGNIFICANT CHANGE UP (ref 5–17)
ANION GAP SERPL CALC-SCNC: 30 MMOL/L — HIGH (ref 7–14)
ANISOCYTOSIS BLD QL: SLIGHT — SIGNIFICANT CHANGE UP
APTT BLD: 21.9 SEC — LOW (ref 27.5–35.5)
APTT BLD: 39.3 SEC — HIGH (ref 27–36.3)
AST SERPL-CCNC: 30 U/L — SIGNIFICANT CHANGE UP (ref 4–32)
AST SERPL-CCNC: 34 U/L — SIGNIFICANT CHANGE UP (ref 10–40)
AST SERPL-CCNC: 41 U/L — HIGH (ref 10–40)
AST SERPL-CCNC: 45 U/L — HIGH (ref 10–40)
AST SERPL-CCNC: 45 U/L — HIGH (ref 10–40)
B PERT DNA SPEC QL NAA+PROBE: SIGNIFICANT CHANGE UP
B PERT+PARAPERT DNA PNL SPEC NAA+PROBE: SIGNIFICANT CHANGE UP
BASE EXCESS BLDV CALC-SCNC: -0.5 MMOL/L — SIGNIFICANT CHANGE UP (ref -2–2)
BASE EXCESS BLDV CALC-SCNC: -15.5 MMOL/L — LOW (ref -2–3)
BASOPHILS # BLD AUTO: 0 K/UL — SIGNIFICANT CHANGE UP (ref 0–0.2)
BASOPHILS NFR BLD AUTO: 0 % — SIGNIFICANT CHANGE UP (ref 0–2)
BILIRUB SERPL-MCNC: 0.2 MG/DL — SIGNIFICANT CHANGE UP (ref 0.2–1.2)
BILIRUB SERPL-MCNC: 0.3 MG/DL — SIGNIFICANT CHANGE UP (ref 0.2–1.2)
BILIRUB SERPL-MCNC: 0.4 MG/DL — SIGNIFICANT CHANGE UP (ref 0.2–1.2)
BLD GP AB SCN SERPL QL: NEGATIVE — SIGNIFICANT CHANGE UP
BLOOD GAS VENOUS COMPREHENSIVE RESULT: SIGNIFICANT CHANGE UP
BORDETELLA PARAPERTUSSIS (RAPRVP): SIGNIFICANT CHANGE UP
BUN SERPL-MCNC: 110 MG/DL — HIGH (ref 7–23)
BUN SERPL-MCNC: 18 MG/DL — SIGNIFICANT CHANGE UP (ref 7–23)
BUN SERPL-MCNC: 20 MG/DL — SIGNIFICANT CHANGE UP (ref 7–23)
BUN SERPL-MCNC: 22 MG/DL — SIGNIFICANT CHANGE UP (ref 7–23)
BUN SERPL-MCNC: 26 MG/DL — HIGH (ref 7–23)
C PNEUM DNA SPEC QL NAA+PROBE: SIGNIFICANT CHANGE UP
CA-I SERPL-SCNC: 1.18 MMOL/L — SIGNIFICANT CHANGE UP (ref 1.15–1.33)
CA-I SERPL-SCNC: 1.22 MMOL/L — SIGNIFICANT CHANGE UP (ref 1.15–1.33)
CALCIUM SERPL-MCNC: 8.4 MG/DL — SIGNIFICANT CHANGE UP (ref 8.4–10.5)
CALCIUM SERPL-MCNC: 8.4 MG/DL — SIGNIFICANT CHANGE UP (ref 8.4–10.5)
CALCIUM SERPL-MCNC: 8.7 MG/DL — SIGNIFICANT CHANGE UP (ref 8.4–10.5)
CALCIUM SERPL-MCNC: 8.7 MG/DL — SIGNIFICANT CHANGE UP (ref 8.4–10.5)
CALCIUM SERPL-MCNC: 9.3 MG/DL — SIGNIFICANT CHANGE UP (ref 8.4–10.5)
CHLORIDE BLDV-SCNC: 102 MMOL/L — SIGNIFICANT CHANGE UP (ref 96–108)
CHLORIDE BLDV-SCNC: 108 MMOL/L — SIGNIFICANT CHANGE UP (ref 96–108)
CHLORIDE SERPL-SCNC: 101 MMOL/L — SIGNIFICANT CHANGE UP (ref 96–108)
CHLORIDE SERPL-SCNC: 101 MMOL/L — SIGNIFICANT CHANGE UP (ref 96–108)
CHLORIDE SERPL-SCNC: 105 MMOL/L — SIGNIFICANT CHANGE UP (ref 96–108)
CHLORIDE SERPL-SCNC: 98 MMOL/L — SIGNIFICANT CHANGE UP (ref 96–108)
CHLORIDE SERPL-SCNC: 99 MMOL/L — SIGNIFICANT CHANGE UP (ref 98–107)
CO2 BLDV-SCNC: 14.6 MMOL/L — LOW (ref 22–26)
CO2 BLDV-SCNC: 25 MMOL/L — SIGNIFICANT CHANGE UP (ref 22–26)
CO2 SERPL-SCNC: 19 MMOL/L — LOW (ref 22–31)
CO2 SERPL-SCNC: 20 MMOL/L — LOW (ref 22–31)
CO2 SERPL-SCNC: 20 MMOL/L — LOW (ref 22–31)
CO2 SERPL-SCNC: 21 MMOL/L — LOW (ref 22–31)
CO2 SERPL-SCNC: 8 MMOL/L — CRITICAL LOW (ref 22–31)
CREAT SERPL-MCNC: 1.29 MG/DL — SIGNIFICANT CHANGE UP (ref 0.5–1.3)
CREAT SERPL-MCNC: 1.33 MG/DL — HIGH (ref 0.5–1.3)
CREAT SERPL-MCNC: 1.42 MG/DL — HIGH (ref 0.5–1.3)
CREAT SERPL-MCNC: 1.44 MG/DL — HIGH (ref 0.5–1.3)
CREAT SERPL-MCNC: 6.26 MG/DL — HIGH (ref 0.5–1.3)
CULTURE RESULTS: SIGNIFICANT CHANGE UP
CULTURE RESULTS: SIGNIFICANT CHANGE UP
EGFR: 6 ML/MIN/1.73M2 — LOW
ELLIPTOCYTES BLD QL SMEAR: SLIGHT — SIGNIFICANT CHANGE UP
EOSINOPHIL # BLD AUTO: 0 K/UL — SIGNIFICANT CHANGE UP (ref 0–0.5)
EOSINOPHIL # BLD AUTO: 0.34 K/UL — SIGNIFICANT CHANGE UP (ref 0–0.5)
EOSINOPHIL # BLD AUTO: 0.51 K/UL — HIGH (ref 0–0.5)
EOSINOPHIL NFR BLD AUTO: 0 % — SIGNIFICANT CHANGE UP (ref 0–6)
EOSINOPHIL NFR BLD AUTO: 1.7 % — SIGNIFICANT CHANGE UP (ref 0–6)
EOSINOPHIL NFR BLD AUTO: 2.6 % — SIGNIFICANT CHANGE UP (ref 0–6)
FERRITIN SERPL-MCNC: 507 NG/ML — HIGH (ref 15–150)
FLUAV SUBTYP SPEC NAA+PROBE: SIGNIFICANT CHANGE UP
FLUBV RNA SPEC QL NAA+PROBE: SIGNIFICANT CHANGE UP
GAS PNL BLDV: 133 MMOL/L — LOW (ref 136–145)
GAS PNL BLDV: 142 MMOL/L — SIGNIFICANT CHANGE UP (ref 136–145)
GAS PNL BLDV: SIGNIFICANT CHANGE UP
GLUCOSE BLDC GLUCOMTR-MCNC: 208 MG/DL — HIGH (ref 70–99)
GLUCOSE BLDV-MCNC: 117 MG/DL — HIGH (ref 70–99)
GLUCOSE BLDV-MCNC: 181 MG/DL — HIGH (ref 70–99)
GLUCOSE SERPL-MCNC: 127 MG/DL — HIGH (ref 70–99)
GLUCOSE SERPL-MCNC: 129 MG/DL — HIGH (ref 70–99)
GLUCOSE SERPL-MCNC: 69 MG/DL — LOW (ref 70–99)
GLUCOSE SERPL-MCNC: 81 MG/DL — SIGNIFICANT CHANGE UP (ref 70–99)
GLUCOSE SERPL-MCNC: 92 MG/DL — SIGNIFICANT CHANGE UP (ref 70–99)
HADV DNA SPEC QL NAA+PROBE: SIGNIFICANT CHANGE UP
HAPTOGLOB SERPL-MCNC: 337 MG/DL — HIGH (ref 34–200)
HCO3 BLDV-SCNC: 13 MMOL/L — LOW (ref 22–29)
HCO3 BLDV-SCNC: 24 MMOL/L — SIGNIFICANT CHANGE UP (ref 22–29)
HCOV 229E RNA SPEC QL NAA+PROBE: SIGNIFICANT CHANGE UP
HCOV HKU1 RNA SPEC QL NAA+PROBE: SIGNIFICANT CHANGE UP
HCOV NL63 RNA SPEC QL NAA+PROBE: SIGNIFICANT CHANGE UP
HCOV OC43 RNA SPEC QL NAA+PROBE: SIGNIFICANT CHANGE UP
HCT VFR BLD CALC: 17.9 % — CRITICAL LOW (ref 34.5–45)
HCT VFR BLD CALC: 22 % — LOW (ref 34.5–45)
HCT VFR BLD CALC: 23.1 % — LOW (ref 34.5–45)
HCT VFR BLD CALC: 23.8 % — LOW (ref 34.5–45)
HCT VFR BLD CALC: 25.5 % — LOW (ref 34.5–45)
HCT VFR BLDA CALC: 24 % — LOW (ref 34.5–46.5)
HCT VFR BLDA CALC: SIGNIFICANT CHANGE UP % (ref 34.5–46.5)
HGB BLD CALC-MCNC: 7.9 G/DL — LOW (ref 11.7–16.1)
HGB BLD CALC-MCNC: <4 G/DL — CRITICAL LOW (ref 11.5–15.5)
HGB BLD-MCNC: 5.4 G/DL — CRITICAL LOW (ref 11.5–15.5)
HGB BLD-MCNC: 6.7 G/DL — CRITICAL LOW (ref 11.5–15.5)
HGB BLD-MCNC: 6.8 G/DL — CRITICAL LOW (ref 11.5–15.5)
HGB BLD-MCNC: 7.1 G/DL — LOW (ref 11.5–15.5)
HGB BLD-MCNC: 7.8 G/DL — LOW (ref 11.5–15.5)
HMPV RNA SPEC QL NAA+PROBE: SIGNIFICANT CHANGE UP
HPIV1 RNA SPEC QL NAA+PROBE: SIGNIFICANT CHANGE UP
HPIV2 RNA SPEC QL NAA+PROBE: SIGNIFICANT CHANGE UP
HPIV3 RNA SPEC QL NAA+PROBE: SIGNIFICANT CHANGE UP
HPIV4 RNA SPEC QL NAA+PROBE: SIGNIFICANT CHANGE UP
HYPOCHROMIA BLD QL: SIGNIFICANT CHANGE UP
HYPOCHROMIA BLD QL: SLIGHT — SIGNIFICANT CHANGE UP
IANC: 7.6 K/UL — SIGNIFICANT CHANGE UP (ref 1.5–8.5)
INR BLD: 1.38 RATIO — HIGH (ref 0.88–1.16)
INR BLD: 3.71 RATIO — HIGH (ref 0.88–1.16)
IRON SATN MFR SERPL: 11 UG/DL — LOW (ref 30–160)
IRON SATN MFR SERPL: 7 % — LOW (ref 14–50)
LACTATE BLDV-MCNC: 1.3 MMOL/L — SIGNIFICANT CHANGE UP (ref 0.7–2)
LACTATE BLDV-MCNC: 10.4 MMOL/L — CRITICAL HIGH (ref 0.5–2)
LDH SERPL L TO P-CCNC: 226 U/L — SIGNIFICANT CHANGE UP (ref 50–242)
LYMPHOCYTES # BLD AUTO: 0.67 K/UL — LOW (ref 1–3.3)
LYMPHOCYTES # BLD AUTO: 0.73 K/UL — LOW (ref 1–3.3)
LYMPHOCYTES # BLD AUTO: 1.41 K/UL — SIGNIFICANT CHANGE UP (ref 1–3.3)
LYMPHOCYTES # BLD AUTO: 3.4 % — LOW (ref 13–44)
LYMPHOCYTES # BLD AUTO: 7 % — LOW (ref 13–44)
LYMPHOCYTES # BLD AUTO: 8.8 % — LOW (ref 13–44)
M PNEUMO DNA SPEC QL NAA+PROBE: SIGNIFICANT CHANGE UP
MACROCYTES BLD QL: SLIGHT — SIGNIFICANT CHANGE UP
MAGNESIUM SERPL-MCNC: 1.8 MG/DL — SIGNIFICANT CHANGE UP (ref 1.6–2.6)
MAGNESIUM SERPL-MCNC: 1.9 MG/DL — SIGNIFICANT CHANGE UP (ref 1.6–2.6)
MAGNESIUM SERPL-MCNC: 2 MG/DL — SIGNIFICANT CHANGE UP (ref 1.6–2.6)
MANUAL SMEAR VERIFICATION: SIGNIFICANT CHANGE UP
MANUAL SMEAR VERIFICATION: SIGNIFICANT CHANGE UP
MCHC RBC-ENTMCNC: 23.7 PG — LOW (ref 27–34)
MCHC RBC-ENTMCNC: 23.8 PG — LOW (ref 27–34)
MCHC RBC-ENTMCNC: 24.4 PG — LOW (ref 27–34)
MCHC RBC-ENTMCNC: 29.4 GM/DL — LOW (ref 32–36)
MCHC RBC-ENTMCNC: 29.8 GM/DL — LOW (ref 32–36)
MCHC RBC-ENTMCNC: 30.2 GM/DL — LOW (ref 32–36)
MCHC RBC-ENTMCNC: 30.5 GM/DL — LOW (ref 32–36)
MCHC RBC-ENTMCNC: 30.6 GM/DL — LOW (ref 32–36)
MCV RBC AUTO: 77.5 FL — LOW (ref 80–100)
MCV RBC AUTO: 78.3 FL — LOW (ref 80–100)
MCV RBC AUTO: 79.3 FL — LOW (ref 80–100)
MCV RBC AUTO: 80.5 FL — SIGNIFICANT CHANGE UP (ref 80–100)
MCV RBC AUTO: 81 FL — SIGNIFICANT CHANGE UP (ref 80–100)
MONOCYTES # BLD AUTO: 0 K/UL — SIGNIFICANT CHANGE UP (ref 0–0.9)
MONOCYTES # BLD AUTO: 1.54 K/UL — HIGH (ref 0–0.9)
MONOCYTES # BLD AUTO: 1.57 K/UL — HIGH (ref 0–0.9)
MONOCYTES NFR BLD AUTO: 0 % — LOW (ref 2–14)
MONOCYTES NFR BLD AUTO: 7.8 % — SIGNIFICANT CHANGE UP (ref 2–14)
MONOCYTES NFR BLD AUTO: 7.8 % — SIGNIFICANT CHANGE UP (ref 2–14)
MYELOCYTES NFR BLD: 1.7 % — HIGH (ref 0–0)
MYELOCYTES NFR BLD: 1.7 % — HIGH (ref 0–0)
NEUTROPHILS # BLD AUTO: 16.5 K/UL — HIGH (ref 1.8–7.4)
NEUTROPHILS # BLD AUTO: 16.65 K/UL — HIGH (ref 1.8–7.4)
NEUTROPHILS # BLD AUTO: 7.54 K/UL — HIGH (ref 1.8–7.4)
NEUTROPHILS NFR BLD AUTO: 74.5 % — SIGNIFICANT CHANGE UP (ref 43–77)
NEUTROPHILS NFR BLD AUTO: 81.8 % — HIGH (ref 43–77)
NEUTROPHILS NFR BLD AUTO: 83.6 % — HIGH (ref 43–77)
NEUTS BAND # BLD: 0.9 % — SIGNIFICANT CHANGE UP (ref 0–8)
NRBC # BLD: 0 /100 WBCS — SIGNIFICANT CHANGE UP (ref 0–0)
NRBC # BLD: 0 /100 WBCS — SIGNIFICANT CHANGE UP (ref 0–0)
NT-PROBNP SERPL-SCNC: 943 PG/ML — HIGH (ref 0–300)
PCO2 BLDV: 39 MMHG — SIGNIFICANT CHANGE UP (ref 39–42)
PCO2 BLDV: 41 MMHG — SIGNIFICANT CHANGE UP (ref 39–42)
PH BLDV: 7.12 — LOW (ref 7.32–7.43)
PH BLDV: 7.4 — SIGNIFICANT CHANGE UP (ref 7.32–7.43)
PHOSPHATE SERPL-MCNC: 3 MG/DL — SIGNIFICANT CHANGE UP (ref 2.5–4.5)
PHOSPHATE SERPL-MCNC: 4 MG/DL — SIGNIFICANT CHANGE UP (ref 2.5–4.5)
PHOSPHATE SERPL-MCNC: 4.3 MG/DL — SIGNIFICANT CHANGE UP (ref 2.5–4.5)
PLAT MORPH BLD: NORMAL — SIGNIFICANT CHANGE UP
PLAT MORPH BLD: NORMAL — SIGNIFICANT CHANGE UP
PLATELET # BLD AUTO: 427 K/UL — HIGH (ref 150–400)
PLATELET # BLD AUTO: 565 K/UL — HIGH (ref 150–400)
PLATELET # BLD AUTO: 601 K/UL — HIGH (ref 150–400)
PLATELET # BLD AUTO: 622 K/UL — HIGH (ref 150–400)
PLATELET # BLD AUTO: 653 K/UL — HIGH (ref 150–400)
PO2 BLDV: 18 MMHG — LOW (ref 25–45)
PO2 BLDV: <20 MMHG — SIGNIFICANT CHANGE UP
POIKILOCYTOSIS BLD QL AUTO: SLIGHT — SIGNIFICANT CHANGE UP
POIKILOCYTOSIS BLD QL AUTO: SLIGHT — SIGNIFICANT CHANGE UP
POLYCHROMASIA BLD QL SMEAR: SLIGHT — SIGNIFICANT CHANGE UP
POLYCHROMASIA BLD QL SMEAR: SLIGHT — SIGNIFICANT CHANGE UP
POTASSIUM BLDV-SCNC: 4.5 MMOL/L — SIGNIFICANT CHANGE UP (ref 3.5–5.1)
POTASSIUM BLDV-SCNC: 6 MMOL/L — HIGH (ref 3.5–5.1)
POTASSIUM SERPL-MCNC: 4.5 MMOL/L — SIGNIFICANT CHANGE UP (ref 3.5–5.3)
POTASSIUM SERPL-MCNC: 4.9 MMOL/L — SIGNIFICANT CHANGE UP (ref 3.5–5.3)
POTASSIUM SERPL-MCNC: 5.1 MMOL/L — SIGNIFICANT CHANGE UP (ref 3.5–5.3)
POTASSIUM SERPL-MCNC: 5.2 MMOL/L — SIGNIFICANT CHANGE UP (ref 3.5–5.3)
POTASSIUM SERPL-MCNC: 7.1 MMOL/L — CRITICAL HIGH (ref 3.5–5.3)
POTASSIUM SERPL-SCNC: 4.5 MMOL/L — SIGNIFICANT CHANGE UP (ref 3.5–5.3)
POTASSIUM SERPL-SCNC: 4.9 MMOL/L — SIGNIFICANT CHANGE UP (ref 3.5–5.3)
POTASSIUM SERPL-SCNC: 5.1 MMOL/L — SIGNIFICANT CHANGE UP (ref 3.5–5.3)
POTASSIUM SERPL-SCNC: 5.2 MMOL/L — SIGNIFICANT CHANGE UP (ref 3.5–5.3)
POTASSIUM SERPL-SCNC: 7.1 MMOL/L — CRITICAL HIGH (ref 3.5–5.3)
PROT SERPL-MCNC: 5.8 G/DL — LOW (ref 6–8.3)
PROT SERPL-MCNC: 6.3 G/DL — SIGNIFICANT CHANGE UP (ref 6–8.3)
PROT SERPL-MCNC: 6.4 G/DL — SIGNIFICANT CHANGE UP (ref 6–8.3)
PROT SERPL-MCNC: 6.5 G/DL — SIGNIFICANT CHANGE UP (ref 6–8.3)
PROT SERPL-MCNC: 7.5 G/DL — SIGNIFICANT CHANGE UP (ref 6–8.3)
PROTHROM AB SERPL-ACNC: 16.3 SEC — HIGH (ref 10.6–13.6)
PROTHROM AB SERPL-ACNC: 43.6 SEC — HIGH (ref 10.5–13.4)
RAPID RVP RESULT: SIGNIFICANT CHANGE UP
RAPID RVP RESULT: SIGNIFICANT CHANGE UP
RBC # BLD: 2.21 M/UL — LOW (ref 3.8–5.2)
RBC # BLD: 2.81 M/UL — LOW (ref 3.8–5.2)
RBC # BLD: 2.87 M/UL — LOW (ref 3.8–5.2)
RBC # BLD: 3 M/UL — LOW (ref 3.8–5.2)
RBC # BLD: 3.29 M/UL — LOW (ref 3.8–5.2)
RBC # FLD: 16.6 % — HIGH (ref 10.3–14.5)
RBC # FLD: 16.9 % — HIGH (ref 10.3–14.5)
RBC # FLD: 17.2 % — HIGH (ref 10.3–14.5)
RBC # FLD: 17.5 % — HIGH (ref 10.3–14.5)
RBC # FLD: 22.5 % — HIGH (ref 10.3–14.5)
RBC BLD AUTO: ABNORMAL
RBC BLD AUTO: ABNORMAL
RH IG SCN BLD-IMP: POSITIVE — SIGNIFICANT CHANGE UP
RSV RNA SPEC QL NAA+PROBE: SIGNIFICANT CHANGE UP
RV+EV RNA SPEC QL NAA+PROBE: SIGNIFICANT CHANGE UP
SAO2 % BLDV: 28.6 % — LOW (ref 67–88)
SAO2 % BLDV: 82.5 % — SIGNIFICANT CHANGE UP
SARS-COV-2 RNA SPEC QL NAA+PROBE: SIGNIFICANT CHANGE UP
SARS-COV-2 RNA SPEC QL NAA+PROBE: SIGNIFICANT CHANGE UP
SCHISTOCYTES BLD QL AUTO: SLIGHT — SIGNIFICANT CHANGE UP
SODIUM SERPL-SCNC: 132 MMOL/L — LOW (ref 135–145)
SODIUM SERPL-SCNC: 133 MMOL/L — LOW (ref 135–145)
SODIUM SERPL-SCNC: 136 MMOL/L — SIGNIFICANT CHANGE UP (ref 135–145)
SODIUM SERPL-SCNC: 137 MMOL/L — SIGNIFICANT CHANGE UP (ref 135–145)
SODIUM SERPL-SCNC: 137 MMOL/L — SIGNIFICANT CHANGE UP (ref 135–145)
SPECIMEN SOURCE: SIGNIFICANT CHANGE UP
SPECIMEN SOURCE: SIGNIFICANT CHANGE UP
T3 SERPL-MCNC: 56 NG/DL — LOW (ref 80–200)
T4 AB SER-ACNC: 5 UG/DL — SIGNIFICANT CHANGE UP (ref 4.6–12)
T4 FREE SERPL-MCNC: 1.1 NG/DL — SIGNIFICANT CHANGE UP (ref 0.9–1.8)
TARGETS BLD QL SMEAR: SIGNIFICANT CHANGE UP
TARGETS BLD QL SMEAR: SLIGHT — SIGNIFICANT CHANGE UP
TIBC SERPL-MCNC: 160 UG/DL — LOW (ref 220–430)
TSH SERPL-MCNC: 6.2 UIU/ML — HIGH (ref 0.27–4.2)
TSH SERPL-MCNC: 6.98 UIU/ML — HIGH (ref 0.27–4.2)
TSH SERPL-MCNC: 9.1 UIU/ML — HIGH (ref 0.27–4.2)
UIBC SERPL-MCNC: 149 UG/DL — SIGNIFICANT CHANGE UP (ref 110–370)
WBC # BLD: 19.71 K/UL — HIGH (ref 3.8–10.5)
WBC # BLD: 20.17 K/UL — HIGH (ref 3.8–10.5)
WBC # BLD: 22.14 K/UL — HIGH (ref 3.8–10.5)
WBC # BLD: 22.66 K/UL — HIGH (ref 3.8–10.5)
WBC # BLD: 8.35 K/UL — SIGNIFICANT CHANGE UP (ref 3.8–10.5)
WBC # FLD AUTO: 19.71 K/UL — HIGH (ref 3.8–10.5)
WBC # FLD AUTO: 20.17 K/UL — HIGH (ref 3.8–10.5)
WBC # FLD AUTO: 22.14 K/UL — HIGH (ref 3.8–10.5)
WBC # FLD AUTO: 22.66 K/UL — HIGH (ref 3.8–10.5)
WBC # FLD AUTO: 8.35 K/UL — SIGNIFICANT CHANGE UP (ref 3.8–10.5)

## 2022-01-01 PROCEDURE — 74176 CT ABD & PELVIS W/O CONTRAST: CPT | Mod: MA

## 2022-01-01 PROCEDURE — 99223 1ST HOSP IP/OBS HIGH 75: CPT | Mod: GC

## 2022-01-01 PROCEDURE — 31500 INSERT EMERGENCY AIRWAY: CPT

## 2022-01-01 PROCEDURE — 84480 ASSAY TRIIODOTHYRONINE (T3): CPT

## 2022-01-01 PROCEDURE — 99233 SBSQ HOSP IP/OBS HIGH 50: CPT | Mod: GC

## 2022-01-01 PROCEDURE — 83735 ASSAY OF MAGNESIUM: CPT

## 2022-01-01 PROCEDURE — 93970 EXTREMITY STUDY: CPT | Mod: 26

## 2022-01-01 PROCEDURE — 84439 ASSAY OF FREE THYROXINE: CPT

## 2022-01-01 PROCEDURE — 82803 BLOOD GASES ANY COMBINATION: CPT

## 2022-01-01 PROCEDURE — 82330 ASSAY OF CALCIUM: CPT

## 2022-01-01 PROCEDURE — 84100 ASSAY OF PHOSPHORUS: CPT

## 2022-01-01 PROCEDURE — 84443 ASSAY THYROID STIM HORMONE: CPT

## 2022-01-01 PROCEDURE — 80053 COMPREHEN METABOLIC PANEL: CPT

## 2022-01-01 PROCEDURE — 71250 CT THORAX DX C-: CPT | Mod: 26,MA

## 2022-01-01 PROCEDURE — 0225U NFCT DS DNA&RNA 21 SARSCOV2: CPT

## 2022-01-01 PROCEDURE — 86850 RBC ANTIBODY SCREEN: CPT

## 2022-01-01 PROCEDURE — 71250 CT THORAX DX C-: CPT | Mod: MA

## 2022-01-01 PROCEDURE — 99285 EMERGENCY DEPT VISIT HI MDM: CPT | Mod: 25

## 2022-01-01 PROCEDURE — 71045 X-RAY EXAM CHEST 1 VIEW: CPT

## 2022-01-01 PROCEDURE — 84484 ASSAY OF TROPONIN QUANT: CPT

## 2022-01-01 PROCEDURE — 83615 LACTATE (LD) (LDH) ENZYME: CPT

## 2022-01-01 PROCEDURE — 86901 BLOOD TYPING SEROLOGIC RH(D): CPT

## 2022-01-01 PROCEDURE — 83550 IRON BINDING TEST: CPT

## 2022-01-01 PROCEDURE — 71045 X-RAY EXAM CHEST 1 VIEW: CPT | Mod: 26

## 2022-01-01 PROCEDURE — 83010 ASSAY OF HAPTOGLOBIN QUANT: CPT

## 2022-01-01 PROCEDURE — 83540 ASSAY OF IRON: CPT

## 2022-01-01 PROCEDURE — 83880 ASSAY OF NATRIURETIC PEPTIDE: CPT

## 2022-01-01 PROCEDURE — 93970 EXTREMITY STUDY: CPT

## 2022-01-01 PROCEDURE — 84132 ASSAY OF SERUM POTASSIUM: CPT

## 2022-01-01 PROCEDURE — 99291 CRITICAL CARE FIRST HOUR: CPT

## 2022-01-01 PROCEDURE — 84295 ASSAY OF SERUM SODIUM: CPT

## 2022-01-01 PROCEDURE — 85730 THROMBOPLASTIN TIME PARTIAL: CPT

## 2022-01-01 PROCEDURE — 36415 COLL VENOUS BLD VENIPUNCTURE: CPT

## 2022-01-01 PROCEDURE — 87040 BLOOD CULTURE FOR BACTERIA: CPT

## 2022-01-01 PROCEDURE — 86900 BLOOD TYPING SEROLOGIC ABO: CPT

## 2022-01-01 PROCEDURE — 99291 CRITICAL CARE FIRST HOUR: CPT | Mod: GC

## 2022-01-01 PROCEDURE — 82435 ASSAY OF BLOOD CHLORIDE: CPT

## 2022-01-01 PROCEDURE — 93005 ELECTROCARDIOGRAM TRACING: CPT

## 2022-01-01 PROCEDURE — 85610 PROTHROMBIN TIME: CPT

## 2022-01-01 PROCEDURE — 85025 COMPLETE CBC W/AUTO DIFF WBC: CPT

## 2022-01-01 PROCEDURE — 84436 ASSAY OF TOTAL THYROXINE: CPT

## 2022-01-01 PROCEDURE — 85014 HEMATOCRIT: CPT

## 2022-01-01 PROCEDURE — 96374 THER/PROPH/DIAG INJ IV PUSH: CPT

## 2022-01-01 PROCEDURE — 99221 1ST HOSP IP/OBS SF/LOW 40: CPT | Mod: GC

## 2022-01-01 PROCEDURE — 74176 CT ABD & PELVIS W/O CONTRAST: CPT | Mod: 26,MA

## 2022-01-01 PROCEDURE — 85018 HEMOGLOBIN: CPT

## 2022-01-01 PROCEDURE — 83605 ASSAY OF LACTIC ACID: CPT

## 2022-01-01 PROCEDURE — 85027 COMPLETE CBC AUTOMATED: CPT

## 2022-01-01 PROCEDURE — 82728 ASSAY OF FERRITIN: CPT

## 2022-01-01 PROCEDURE — 99291 CRITICAL CARE FIRST HOUR: CPT | Mod: 25

## 2022-01-01 PROCEDURE — 99239 HOSP IP/OBS DSCHRG MGMT >30: CPT | Mod: GC

## 2022-01-01 PROCEDURE — 99223 1ST HOSP IP/OBS HIGH 75: CPT

## 2022-01-01 PROCEDURE — 82947 ASSAY GLUCOSE BLOOD QUANT: CPT

## 2022-01-01 RX ORDER — SODIUM CHLORIDE 9 MG/ML
500 INJECTION, SOLUTION INTRAVENOUS
Refills: 0 | Status: DISCONTINUED | OUTPATIENT
Start: 2022-01-01 | End: 2022-01-01

## 2022-01-01 RX ORDER — ACETAMINOPHEN 500 MG
1000 TABLET ORAL ONCE
Refills: 0 | Status: COMPLETED | OUTPATIENT
Start: 2022-01-01 | End: 2022-01-01

## 2022-01-01 RX ORDER — PANTOPRAZOLE SODIUM 20 MG/1
80 TABLET, DELAYED RELEASE ORAL ONCE
Refills: 0 | Status: COMPLETED | OUTPATIENT
Start: 2022-01-01 | End: 2022-01-01

## 2022-01-01 RX ORDER — SODIUM BICARBONATE 1 MEQ/ML
0.21 SYRINGE (ML) INTRAVENOUS
Qty: 150 | Refills: 0 | Status: DISCONTINUED | OUTPATIENT
Start: 2022-01-01 | End: 2022-01-01

## 2022-01-01 RX ORDER — EPINEPHRINE 0.3 MG/.3ML
0.5 INJECTION INTRAMUSCULAR; SUBCUTANEOUS ONCE
Refills: 0 | Status: COMPLETED | OUTPATIENT
Start: 2022-01-01 | End: 2022-01-01

## 2022-01-01 RX ORDER — PROPOFOL 10 MG/ML
10 INJECTION, EMULSION INTRAVENOUS
Qty: 1000 | Refills: 0 | Status: DISCONTINUED | OUTPATIENT
Start: 2022-01-01 | End: 2022-01-01

## 2022-01-01 RX ORDER — SODIUM CHLORIDE 9 MG/ML
2000 INJECTION INTRAMUSCULAR; INTRAVENOUS; SUBCUTANEOUS ONCE
Refills: 0 | Status: COMPLETED | OUTPATIENT
Start: 2022-01-01 | End: 2022-01-01

## 2022-01-01 RX ORDER — IRON SUCROSE 20 MG/ML
200 INJECTION, SOLUTION INTRAVENOUS EVERY 24 HOURS
Refills: 0 | Status: DISCONTINUED | OUTPATIENT
Start: 2022-01-01 | End: 2022-01-01

## 2022-01-01 RX ORDER — HYDROCORTISONE 20 MG
100 TABLET ORAL ONCE
Refills: 0 | Status: COMPLETED | OUTPATIENT
Start: 2022-01-01 | End: 2022-01-01

## 2022-01-01 RX ORDER — ASPIRIN/CALCIUM CARB/MAGNESIUM 324 MG
0 TABLET ORAL
Qty: 0 | Refills: 0 | DISCHARGE

## 2022-01-01 RX ORDER — SODIUM CHLORIDE 9 MG/ML
1000 INJECTION INTRAMUSCULAR; INTRAVENOUS; SUBCUTANEOUS ONCE
Refills: 0 | Status: COMPLETED | OUTPATIENT
Start: 2022-01-01 | End: 2022-01-01

## 2022-01-01 RX ORDER — VANCOMYCIN HCL 1 G
1000 VIAL (EA) INTRAVENOUS ONCE
Refills: 0 | Status: COMPLETED | OUTPATIENT
Start: 2022-01-01 | End: 2022-01-01

## 2022-01-01 RX ORDER — PIPERACILLIN AND TAZOBACTAM 4; .5 G/20ML; G/20ML
3.38 INJECTION, POWDER, LYOPHILIZED, FOR SOLUTION INTRAVENOUS ONCE
Refills: 0 | Status: COMPLETED | OUTPATIENT
Start: 2022-01-01 | End: 2022-01-01

## 2022-01-01 RX ORDER — HYDROCORTISONE 20 MG
500 TABLET ORAL ONCE
Refills: 0 | Status: DISCONTINUED | OUTPATIENT
Start: 2022-01-01 | End: 2022-01-01

## 2022-01-01 RX ORDER — APIXABAN 2.5 MG/1
1 TABLET, FILM COATED ORAL
Qty: 180 | Refills: 0
Start: 2022-01-01 | End: 2022-05-27

## 2022-01-01 RX ORDER — CIPROFLOXACIN LACTATE 400MG/40ML
1 VIAL (ML) INTRAVENOUS
Qty: 8 | Refills: 0
Start: 2022-01-01 | End: 2022-01-01

## 2022-01-01 RX ORDER — APIXABAN 2.5 MG/1
2 TABLET, FILM COATED ORAL
Qty: 28 | Refills: 0
Start: 2022-01-01 | End: 2022-01-01

## 2022-01-01 RX ORDER — INSULIN HUMAN 100 [IU]/ML
5 INJECTION, SOLUTION SUBCUTANEOUS ONCE
Refills: 0 | Status: COMPLETED | OUTPATIENT
Start: 2022-01-01 | End: 2022-01-01

## 2022-01-01 RX ORDER — PIPERACILLIN AND TAZOBACTAM 4; .5 G/20ML; G/20ML
3.38 INJECTION, POWDER, LYOPHILIZED, FOR SOLUTION INTRAVENOUS EVERY 8 HOURS
Refills: 0 | Status: DISCONTINUED | OUTPATIENT
Start: 2022-01-01 | End: 2022-01-01

## 2022-01-01 RX ORDER — NOREPINEPHRINE BITARTRATE/D5W 8 MG/250ML
0.05 PLASTIC BAG, INJECTION (ML) INTRAVENOUS
Qty: 8 | Refills: 0 | Status: DISCONTINUED | OUTPATIENT
Start: 2022-01-01 | End: 2022-01-01

## 2022-01-01 RX ORDER — SODIUM BICARBONATE 1 MEQ/ML
50 SYRINGE (ML) INTRAVENOUS ONCE
Refills: 0 | Status: COMPLETED | OUTPATIENT
Start: 2022-01-01 | End: 2022-01-01

## 2022-01-01 RX ORDER — CHLORHEXIDINE GLUCONATE 213 G/1000ML
15 SOLUTION TOPICAL EVERY 12 HOURS
Refills: 0 | Status: DISCONTINUED | OUTPATIENT
Start: 2022-01-01 | End: 2022-01-01

## 2022-01-01 RX ORDER — IRON SUCROSE 20 MG/ML
100 INJECTION, SOLUTION INTRAVENOUS EVERY 24 HOURS
Refills: 0 | Status: DISCONTINUED | OUTPATIENT
Start: 2022-01-01 | End: 2022-01-01

## 2022-01-01 RX ORDER — HYDROMORPHONE HYDROCHLORIDE 2 MG/ML
0.5 INJECTION INTRAMUSCULAR; INTRAVENOUS; SUBCUTANEOUS ONCE
Refills: 0 | Status: DISCONTINUED | OUTPATIENT
Start: 2022-01-01 | End: 2022-01-01

## 2022-01-01 RX ORDER — EPINEPHRINE 0.3 MG/.3ML
1 INJECTION INTRAMUSCULAR; SUBCUTANEOUS ONCE
Refills: 0 | Status: DISCONTINUED | OUTPATIENT
Start: 2022-01-01 | End: 2022-01-01

## 2022-01-01 RX ORDER — FERROUS SULFATE 325(65) MG
1 TABLET ORAL
Qty: 30 | Refills: 0
Start: 2022-01-01 | End: 2022-03-21

## 2022-01-01 RX ORDER — HEPARIN SODIUM 5000 [USP'U]/ML
5000 INJECTION INTRAVENOUS; SUBCUTANEOUS EVERY 8 HOURS
Refills: 0 | Status: DISCONTINUED | OUTPATIENT
Start: 2022-01-01 | End: 2022-01-01

## 2022-01-01 RX ORDER — DEXTROSE 50 % IN WATER 50 %
50 SYRINGE (ML) INTRAVENOUS ONCE
Refills: 0 | Status: COMPLETED | OUTPATIENT
Start: 2022-01-01 | End: 2022-01-01

## 2022-01-01 RX ORDER — ENOXAPARIN SODIUM 100 MG/ML
64 INJECTION SUBCUTANEOUS ONCE
Refills: 0 | Status: DISCONTINUED | OUTPATIENT
Start: 2022-01-01 | End: 2022-01-01

## 2022-01-01 RX ORDER — CHLORHEXIDINE GLUCONATE 213 G/1000ML
1 SOLUTION TOPICAL
Refills: 0 | Status: DISCONTINUED | OUTPATIENT
Start: 2022-01-01 | End: 2022-01-01

## 2022-01-01 RX ADMIN — PIPERACILLIN AND TAZOBACTAM 200 GRAM(S): 4; .5 INJECTION, POWDER, LYOPHILIZED, FOR SOLUTION INTRAVENOUS at 17:37

## 2022-01-01 RX ADMIN — IRON SUCROSE 220 MILLIGRAM(S): 20 INJECTION, SOLUTION INTRAVENOUS at 16:35

## 2022-01-01 RX ADMIN — Medication 400 MILLIGRAM(S): at 20:40

## 2022-01-01 RX ADMIN — INSULIN HUMAN 5 UNIT(S): 100 INJECTION, SOLUTION SUBCUTANEOUS at 20:57

## 2022-01-01 RX ADMIN — Medication 50 MILLIEQUIVALENT(S): at 18:07

## 2022-01-01 RX ADMIN — Medication 50 MILLILITER(S): at 20:56

## 2022-01-01 RX ADMIN — IRON SUCROSE 220 MILLIGRAM(S): 20 INJECTION, SOLUTION INTRAVENOUS at 16:50

## 2022-01-01 RX ADMIN — HYDROMORPHONE HYDROCHLORIDE 0.5 MILLIGRAM(S): 2 INJECTION INTRAMUSCULAR; INTRAVENOUS; SUBCUTANEOUS at 23:07

## 2022-01-01 RX ADMIN — PIPERACILLIN AND TAZOBACTAM 25 GRAM(S): 4; .5 INJECTION, POWDER, LYOPHILIZED, FOR SOLUTION INTRAVENOUS at 05:23

## 2022-01-01 RX ADMIN — PIPERACILLIN AND TAZOBACTAM 25 GRAM(S): 4; .5 INJECTION, POWDER, LYOPHILIZED, FOR SOLUTION INTRAVENOUS at 11:11

## 2022-01-01 RX ADMIN — Medication 6.56 MICROGRAM(S)/KG/MIN: at 17:49

## 2022-01-01 RX ADMIN — Medication 100 MEQ/KG/HR: at 20:56

## 2022-01-01 RX ADMIN — PIPERACILLIN AND TAZOBACTAM 200 GRAM(S): 4; .5 INJECTION, POWDER, LYOPHILIZED, FOR SOLUTION INTRAVENOUS at 07:50

## 2022-01-01 RX ADMIN — SODIUM CHLORIDE 1000 MILLILITER(S): 9 INJECTION INTRAMUSCULAR; INTRAVENOUS; SUBCUTANEOUS at 20:40

## 2022-01-01 RX ADMIN — Medication 100 MILLIGRAM(S): at 18:45

## 2022-01-01 RX ADMIN — Medication 100 MILLIGRAM(S): at 18:06

## 2022-01-01 RX ADMIN — SODIUM CHLORIDE 2000 MILLILITER(S): 9 INJECTION INTRAMUSCULAR; INTRAVENOUS; SUBCUTANEOUS at 17:41

## 2022-01-01 RX ADMIN — PIPERACILLIN AND TAZOBACTAM 25 GRAM(S): 4; .5 INJECTION, POWDER, LYOPHILIZED, FOR SOLUTION INTRAVENOUS at 11:36

## 2022-01-01 RX ADMIN — PIPERACILLIN AND TAZOBACTAM 25 GRAM(S): 4; .5 INJECTION, POWDER, LYOPHILIZED, FOR SOLUTION INTRAVENOUS at 21:23

## 2022-01-01 RX ADMIN — EPINEPHRINE 0.5 MILLIGRAM(S): 0.3 INJECTION INTRAMUSCULAR; SUBCUTANEOUS at 20:58

## 2022-01-01 RX ADMIN — Medication 50 MILLIEQUIVALENT(S): at 20:17

## 2022-01-01 RX ADMIN — SODIUM CHLORIDE 125 MILLILITER(S): 9 INJECTION, SOLUTION INTRAVENOUS at 07:29

## 2022-01-01 RX ADMIN — PIPERACILLIN AND TAZOBACTAM 25 GRAM(S): 4; .5 INJECTION, POWDER, LYOPHILIZED, FOR SOLUTION INTRAVENOUS at 04:13

## 2022-01-01 RX ADMIN — PIPERACILLIN AND TAZOBACTAM 25 GRAM(S): 4; .5 INJECTION, POWDER, LYOPHILIZED, FOR SOLUTION INTRAVENOUS at 12:16

## 2022-01-01 RX ADMIN — PIPERACILLIN AND TAZOBACTAM 25 GRAM(S): 4; .5 INJECTION, POWDER, LYOPHILIZED, FOR SOLUTION INTRAVENOUS at 21:10

## 2022-01-01 RX ADMIN — Medication 250 MILLIGRAM(S): at 20:40

## 2022-01-01 RX ADMIN — PIPERACILLIN AND TAZOBACTAM 25 GRAM(S): 4; .5 INJECTION, POWDER, LYOPHILIZED, FOR SOLUTION INTRAVENOUS at 21:19

## 2022-01-01 RX ADMIN — HEPARIN SODIUM 5000 UNIT(S): 5000 INJECTION INTRAVENOUS; SUBCUTANEOUS at 13:56

## 2022-01-01 RX ADMIN — PANTOPRAZOLE SODIUM 80 MILLIGRAM(S): 20 TABLET, DELAYED RELEASE ORAL at 11:11

## 2022-01-01 RX ADMIN — PIPERACILLIN AND TAZOBACTAM 25 GRAM(S): 4; .5 INJECTION, POWDER, LYOPHILIZED, FOR SOLUTION INTRAVENOUS at 04:00

## 2022-02-17 NOTE — ED PROVIDER NOTE - PHYSICAL EXAMINATION
GENERAL APPEARANCE: Well developed, NAD  HEENT:  PERRL,  hearing grossly intact.  NECK: Neck supple, non-tender , no masses or thyromegaly.  CARDIAC: Normal S1 and S2. no mrg. RRR  LUNGS: Decreased breath sounds B/L, no rales, rhonchi, or wheezing  ABDOMEN: Soft , NTND, bowel sounds normal. No guarding or rebound.   MUSCULOSKELETAL: ROM intact.  No joint erythema or tenderness.   EXTREMITIES: 2+ pitting edema B/L. Peripheral pulses intact.   NEUROLOGICAL: Non focal. Strength and sensation symmetric and intact throughout.   SKIN: Warm and dry , Well perfused  PSYCHIATRIC: Awake and alert

## 2022-02-17 NOTE — H&P ADULT - NSHPPHYSICALEXAM_GEN_ALL_CORE
VITALS:   T(C): 36.6 (02-17-22 @ 03:45), Max: 36.6 (02-17-22 @ 03:45)  HR: 106 (02-17-22 @ 09:00) (91 - 128)  BP: 111/70 (02-17-22 @ 09:00) (111/70 - 166/88)  RR: 18 (02-17-22 @ 09:00) (18 - 20)  SpO2: 97% (02-17-22 @ 09:00) (95% - 100%)    GENERAL: NAD, lying in bed comfortably  HEAD:  Atraumatic, normocephalic  EYES: EOMI, PERRLA, conjunctiva and sclera clear  ENT: Moist mucous membranes  NECK: Supple, no JVD  HEART: Regular rate and rhythm, no murmurs, rubs, or gallops  LUNGS: Unlabored respirations.  Clear to auscultation bilaterally, no crackles, wheezing, or rhonchi  ABDOMEN: Soft, nontender, nondistended, +BS  EXTREMITIES: 2+ peripheral pulses bilaterally. No clubbing, cyanosis, or edema  NERVOUS SYSTEM:  A&Ox3, no focal deficits   SKIN: No rashes or lesions VITALS:   T(C): 36.6 (02-17-22 @ 03:45), Max: 36.6 (02-17-22 @ 03:45)  HR: 106 (02-17-22 @ 09:00) (91 - 128)  BP: 111/70 (02-17-22 @ 09:00) (111/70 - 166/88)  RR: 18 (02-17-22 @ 09:00) (18 - 20)  SpO2: 97% (02-17-22 @ 09:00) (95% - 100%)    GENERAL: lying on side; uncomfortable  PSYCH: disgruntled, mood congruent  NEURO: AAO3, no focal deficits  EYES: EOMI, PERRLA, conjunctiva and sclera clear  ENT: Moist mucous membranes  NECK: Supple, no JVD  HEART: Regular rate and rhythm, no murmurs, rubs, or gallops  LUNGS: Unlabored respirations.  Clear to auscultation bilaterally, no crackles, wheezing, or rhonchi  ABDOMEN: Soft, nontender, nondistended, +BS  EXTREMITIES: 2+ peripheral pulses bilaterally. No clubbing, cyanosis, or edema  SKIN: No rashes or lesions VITALS:   T(C): 36.6 (02-17-22 @ 03:45), Max: 36.6 (02-17-22 @ 03:45)  HR: 106 (02-17-22 @ 09:00) (91 - 128)  BP: 111/70 (02-17-22 @ 09:00) (111/70 - 166/88)  RR: 18 (02-17-22 @ 09:00) (18 - 20)  SpO2: 97% (02-17-22 @ 09:00) (95% - 100%)    GENERAL: lying on side; uncomfortable  PSYCH: disgruntled, mood congruent  NEURO: AAO3, no focal deficits  EYES: EOMI, PERRLA, conjunctiva and sclera clear  ENT: Moist mucous membranes  NECK: Supple, no JVD  HEART: Regular rate and rhythm, no murmurs, rubs, or gallops  LUNGS: Unlabored respirations.  Clear to auscultation bilaterally, no crackles, wheezing, or rhonchi  ABDOMEN: Soft, nontender, nondistended, +BS  EXTREMITIES: + LE edema Anasarca appearing, 2+ peripheral pulses bilaterally. No clubbing, cyanosis  SKIN: No rashes or lesions

## 2022-02-17 NOTE — H&P ADULT - NSHPREVIEWOFSYSTEMS_GEN_ALL_CORE
REVIEW OF SYSTEMS:    CONSTITUTIONAL: No weakness, fevers or chills  EYES: No visual changes; no sclera icterics, no pain or drainage  ENT:  No vertigo or throat pain   NECK: No pain or stiffness  RESPIRATORY: No cough, wheezing, hemoptysis; No shortness of breath  CARDIOVASCULAR: No chest pain or palpitations  GASTROINTESTINAL: No abdominal or epigastric pain. No nausea, vomiting, or hematemesis; No diarrhea or constipation. No melena or hematochezia.  GENITOURINARY: No dysuria, frequency or hematuria  NEUROLOGICAL: No numbness or weakness  SKIN: No itching, rashes  Psych: No anxiety or depression REVIEW OF SYSTEMS:    CONSTITUTIONAL: +weight loss, fatigue  EYES: No visual changes; no sclera icterics, no pain or drainage  ENT:  No vertigo or throat pain; No issues eating  NECK: No pain or stiffness  RESPIRATORY: No cough, wheezing, hemoptysis; No shortness of breath  CARDIOVASCULAR: No chest pain or palpitations  GASTROINTESTINAL: No abdominal or epigastric pain. No nausea, vomiting, or hematemesis; No diarrhea or constipation. No melena or hematochezia.  GENITOURINARY: No dysuria, frequency or hematuria  NEUROLOGICAL: No numbness or weakness  SKIN: No itching, rashes REVIEW OF SYSTEMS:    CONSTITUTIONAL: +weight loss, fatigue, + LE swelling  EYES: No visual changes; no sclera icterics, no pain or drainage  ENT:  No vertigo or throat pain; No issues eating  NECK: No pain or stiffness  RESPIRATORY: No cough, wheezing, hemoptysis; No shortness of breath  CARDIOVASCULAR: No chest pain or palpitations  GASTROINTESTINAL: No abdominal or epigastric pain. No nausea, vomiting, or hematemesis; No diarrhea or constipation. No melena or hematochezia.  GENITOURINARY: No dysuria, frequency or hematuria  NEUROLOGICAL: No numbness or weakness  SKIN: No itching, rashes

## 2022-02-17 NOTE — CONSULT NOTE ADULT - ATTENDING COMMENTS
Bilateral DVT , below the knee, in setting of malignancy   Would really want to see if there is a PE - would  re:  a/c dosing (full vs ppx) vs IVC filter  Primary team to discuss GOC with family   Jehova's witness, now with anemia.    Keep on DVT ppx for now  V/Q scan wont be accurate in this setting     Brooke [phone]
80 y/o w/recto-vaginal fistula admitted for lower extremity swelling found to have acute DVT also found to have multiple lung masses on chest CT. Likely malignancy, however infection is also in the differential. Patient reported with "normal" chest CT ~6 months ago per patient report. Patient currently refusing numerous tests/treatments.    - Would establish goals of diagnosis/treatment with patient.  - if patient/family would desire treatment than proceeding with biopsy for tissue diagnosis would be appropriate plan of care, however as patient is refusing multiple tests/treatments biopsy may not be consistent with their goals  - Please attempt to obtain prior imaging mentioned by patient for comparisson  - Would cover with broad spectrum abx for now and send sputum cultures if able  - Therapeutic AC

## 2022-02-17 NOTE — DISCHARGE NOTE PROVIDER - NSDCCPTREATMENT_GEN_ALL_CORE_FT
PRINCIPAL PROCEDURE  Procedure: CT chest wo contrast  Findings and Treatment: ACC: [id number] EXAM: CT ABDOMEN AND PELVIS  PROCEDURE DATE: 02/17/2022  INTERPRETATION: CLINICAL INFORMATION: SOB, tachypnea, abdominal pain.  COMPARISON: None.  CONTRAST/COMPLICATIONS:  IV Contrast: NONE  Oral Contrast: NONE  Complications: None reported at time of study completion  PROCEDURE:  CT of the Chest, Abdomen and Pelvis was performed.  Sagittal and coronal reformats were performed.  FINDINGS:  CHEST:  LUNGS AND LARGE AIRWAYS: Patent central airways. Multiple bilateral spiculated air space opacities with the largest at the medial right lower lobe lung base measuring approximately 6.0 x 3.0 cm (2, 47). Bilateral upper lobe predominant groundglass opacities.  PLEURA: Small bilateral pleural effusions  VESSELS: Atherosclerotic changes of the aorta and coronary arteries.  HEART: Heart size is normal. No pericardial effusion.  MEDIASTINUM AND MEGHANN: No lymphadenopathy.  CHEST WALL AND LOWER NECK: Within normal limits.  ABDOMEN AND PELVIS:  Evaluation of the abdominal and pelvic viscera is limited in the absence of IV contrast.  LIVER: Within normal limits.  BILE DUCTS: Mildly hyperattenuating material within the gallbladder lumen, likely sludge.  GALLBLADDER: Within normal limits.  SPLEEN: Within normal limits.  PANCREAS: Within normal limits.  ADRENALS: Within normal limits.  KIDNEYS/URETERS: Within normal limits.  BLADDER: Within normal limits.  REPRODUCTIVE ORGANS: Hysterectomy.  BOWEL: No bowel obstruction. Appendix is normal.  PERITONEUM: No ascites.  VESSELS: Atherosclerotic changes.  RETROPERITONEUM/LYMPH NODES: No lymphadenopathy.  ABDOMINAL WALL: Within normal limits.  BONES: Grade 1 anterolisthesis of L4 on L5. Chronic right pubic ramus deformity. Degenerative changes.  IMPRESSION:  Bilateral airspace spiculated opacities, the largest in the right lower lobe measuring up to 6.0 cm. These findings are concerning for primary versus metastatic neoplasm.  Nonspecific bilateral upper lobe predominant groundglass opacities.  Small bilateral pleural effusions.  --- End of Report ---

## 2022-02-17 NOTE — ED PROVIDER NOTE - OBJECTIVE STATEMENT
82 y/o F hx of asthma, uterine prolapse req hysterectomy c/b rectovaginal fistula p/w lower extremity swelling, abdominal pain, fatigue, weight loss, decreased PO intake. Per pt's daughter, pt has had dec PO appetite, dec energy  and weight loss for a few months. About a week ago, pt developed "URI symptoms" per daughter consisting of nasal congestion, generalized malaise. Saw an outpatient provider who prescribed amoxicillin and flagyl which pt took for 2-3 days. Daughter states she had pt stop abx 2 days ago when she noted swelling in her legs B/L which she attributed to medication. Gave pt a "herbal diuretic" which did not reveal progressive swelling prompting daughter to bring pt to the E.D. Denies associated chest pain or SOB- though states she has to sometimes take deep breaths due to her asthma. Sensation has not worsened from baseline.     ROS otherwise positive for abdominal pain, described as a dull pain located in the RUQ , 3/10 in severity without radiation. No association with eating or drinking. ROS also positive for increased stool collection in vagina. Pt and daughter endorse chronic fecal output from vagina 2/2 fistula but report increased fecal collections around vagina.     Denies fevers, chills, nausea, vomiting, chest pain, SOB, constipation, diarrhea, lower leg pain, recent travel or sick contacts. 80 y/o F Yazidi hx of asthma, uterine prolapse req hysterectomy c/b rectovaginal fistula p/w lower extremity swelling, abdominal pain, fatigue, weight loss, decreased PO intake. Per pt's daughter, pt has had dec PO appetite, dec energy  and weight loss for a few months. About a week ago, pt developed "URI symptoms" per daughter consisting of nasal congestion, generalized malaise. Saw an outpatient provider who prescribed amoxicillin and flagyl which pt took for 2-3 days. Daughter states she had pt stop abx 2 days ago when she noted swelling in her legs B/L which she attributed to medication. Gave pt a "herbal diuretic" which did not reveal progressive swelling prompting daughter to bring pt to the E.D. Denies associated chest pain or SOB- though states she has to sometimes take deep breaths due to her asthma. Sensation has not worsened from baseline.     ROS otherwise positive for abdominal pain, described as a dull pain located in the RUQ , 3/10 in severity without radiation. No association with eating or drinking. ROS also positive for increased stool collection in vagina. Pt and daughter endorse chronic fecal output from vagina 2/2 fistula but report increased fecal collections around vagina.     Denies fevers, chills, nausea, vomiting, chest pain, SOB, constipation, diarrhea, lower leg pain, recent travel or sick contacts.

## 2022-02-17 NOTE — CHART NOTE - NSCHARTNOTEFT_GEN_A_CORE
Marisol, PGY-3: Pt and daughter state they are [employer], and do not want blood products. 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    Daughter Kayla at bedside states she is the healthcare proxy, any questions can be referred to her  [phone]

## 2022-02-17 NOTE — ED PROVIDER NOTE - NS ED ROS FT
CONSTITUTIONAL:  +weight loss, no fever, no chills,   SKIN:  No rash or itching.  CARDIOVASCULAR:  No chest pain, chest pressure or chest discomfort. No palpitations.  RESPIRATORY:  No shortness of breath, cough or sputum.  GASTROINTESTINAL:  No nausea, vomiting or diarrhea. + abdominal discomfort  GENITOURINARY:  Denies hematuria, dysuria.   NEUROLOGICAL:  No headache, dizziness,  numbness or tingling in the extremities. No change in bowel or bladder control.  MUSCULOSKELETAL:  No muscle, back pain, joint pain or stiffness. +swelling in LE   ENDOCRINOLOGIC:  No reports of sweating, cold or heat intolerance. No polyuria or polydipsia.

## 2022-02-17 NOTE — DISCHARGE NOTE PROVIDER - NSFOLLOWUPCLINICS_GEN_ALL_ED_FT
[facility]  Hematology/Oncology  [address] [place] [address]  Phone: [phone]  Fax:   Follow Up Time: 1 week    [facility] Gynecology and Obstetrics  Gynceology/OB  [address] [place] [address]  Phone: [phone]  Fax:   Follow Up Time: 1 week

## 2022-02-17 NOTE — CONSULT NOTE ADULT - SUBJECTIVE AND OBJECTIVE BOX
Vascular Cardiology Consult Note    DIRECT SERVICE NUMBER:  [phone]           EMAIL [email or web address]   OFFICE [phone]    CC: LE swelling    HPI:    Briefly, 82 y/o Jain w/ PMH of uterine prolapse, and asthma c/o b/l LE edema. Pt in a sour mood and declined to answer most questions nor participate w/ physical. Pt states she had some litany of complaints incl b/l LE edema, 15 lbs unintentional wt loss, and loss of appetite over the last month. She went to PCP, who Rx her meds, which did not help her sxs. She came to ED for eval, where CT chest revealed R lung nodule ~6cm suspicious for malignancy. B/l LE venous duplex also revealed b/l soleal DVT as well as R peroneal DVT. Vasc cards c/s for recs.       Allergies    contrast media (iodine-based) (Other (Moderate))    Intolerances    	    MEDICATIONS: ASA                  PAST MEDICAL & SURGICAL HISTORY:  Asthma    History of hysterectomy        FAMILY HISTORY:      SOCIAL HISTORY:  unchanged    REVIEW OF SYSTEMS:  CONSTITUTIONAL: +Wt loss  EYES: No eye pain, visual disturbances, or discharge  ENT:  No difficulty hearing, tinnitus, vertigo; No sinus or throat pain  NECK: No pain or stiffness  RESPIRATORY:    CARDIOVASCULAR:    GASTROINTESTINAL: No abdominal or epigastric pain. No nausea, vomiting, or hematemesis; No diarrhea or constipation. No melena or hematochezia.  GENITOURINARY: No dysuria, frequency, hematuria, or incontinence  NEUROLOGICAL: No headaches, memory loss, loss of strength, numbness, or tremors  SKIN:   LYMPH Nodes: No enlarged glands  ENDOCRINE: No heat or cold intolerance; No hair loss  MUSCULOSKELETAL: No joint pain or swelling; No muscle, back, or extremity pain  PSYCHIATRIC: No depression, anxiety, mood swings, or difficulty sleeping  HEME/LYMPH: No easy bruising, or bleeding gums  ALLERGY AND IMMUNOLOGIC: No hives or eczema	    [ x] All others negative	  [ ] Unable to obtain    PHYSICAL EXAM:  T(C): 37.2 (02-17-22 @ 15:00), Max: 37.2 (02-17-22 @ 15:00)  HR: 110 (02-17-22 @ 15:00) (91 - 128)  BP: 139/79 (02-17-22 @ 15:00) (111/70 - 166/88)  RR: 20 (02-17-22 @ 15:00) (18 - 20)  SpO2: 98% (02-17-22 @ 15:00) (95% - 100%)  Wt(kg): --  I&O's Summary      Appearance:  Thin  Pt refused exam      LABS:	 	    CBC Full  -  ( 17 Feb 2022 04:38 )  WBC Count : 20.17 K/uL  Hemoglobin : 7.8 g/dL  Hematocrit : 25.5 %  Platelet Count - Automated : 653 K/uL  Mean Cell Volume : 77.5 fl  Mean Cell Hemoglobin : 23.7 pg  Mean Cell Hemoglobin Concentration : 30.6 gm/dL  Auto Neutrophil # : 16.50 K/uL  Auto Lymphocyte # : 1.41 K/uL  Auto Monocyte # : 1.57 K/uL  Auto Eosinophil # : 0.34 K/uL  Auto Basophil # : 0.00 K/uL  Auto Neutrophil % : 81.8 %  Auto Lymphocyte % : 7.0 %  Auto Monocyte % : 7.8 %  Auto Eosinophil % : 1.7 %  Auto Basophil % : 0.0 %    02-17    132<L>  |  98  |  26<H>  ----------------------------<  129<H>  4.5   |  20<L>  |  1.29    Ca    9.3      17 Feb 2022 04:38    TPro  7.5  /  Alb  3.0<L>  /  TBili  0.4  /  DBili  x   /  AST  34  /  ALT  28  /  AlkPhos  90  02-17          Assessment:  1. RLL mass suspicious for malignancy  2. B/l below knee DVT           Plan:  1. DVT likely provoked by malignancy  2. Would tx w/ hep gtt for PTT goal 50-70 for now  3. Ideally check CT PE protocol, however pt has already declined  4. Check TTE  5. Of note, pt is Jain and will likely decline blood products  6. Mgmt of lung mass as per primary team/pulm         Thank you      Vascular Cardiology Service    Please call with any questions:   DIRECT SERVICE NUMBER:  [phone]  Office [phone]  email:  [email or web address]

## 2022-02-17 NOTE — CHART NOTE - NSCHARTNOTEFT_GEN_A_CORE
Spoke to Patient declines Echocardiogram at this time and reports that she refers treatments and studies until her daughter has spoken with all doctors of the team together.     Both daughter and patient was explained the CT findings of pulmonary spiculated nodules concerning for malignancy, and DVT findings bilaterally on VA Dopplers.

## 2022-02-17 NOTE — DISCHARGE NOTE PROVIDER - NSDCMRMEDTOKEN_GEN_ALL_CORE_FT
aspirin 81 mg oral tablet:    ciprofloxacin 500 mg oral tablet, extended release: 1 tab(s) orally every 12 hours   ferrous sulfate 325 mg (65 mg elemental iron) oral delayed release tablet: 1 tab(s) orally once a day

## 2022-02-17 NOTE — DISCHARGE NOTE PROVIDER - NSDCFUADDAPPT_GEN_ALL_CORE_FT
You should follow up with your PCP for your appointment next week.     We also provided the information for the [facility] and the Gynecology clinic for you to schedule follow up appointments.

## 2022-02-17 NOTE — DISCHARGE NOTE PROVIDER - CARE PROVIDER_API CALL
ZARINA SETHI  Colon and Rectal Surgery  [address] [place] [address]  Phone: [phone]  Fax: [phone]  Established Patient  Follow Up Time: 1 week

## 2022-02-17 NOTE — H&P ADULT - PROBLEM SELECTOR PLAN 3
CT Bilateral airspace spiculated opacities, the largest in the right lower lobe measuring up to 6.0 cm.  - CT Bilateral airspace spiculated opacities, the largest in the right lower lobe measuring up to 6.0 cm.  Concerning for possible malignancy CT Bilateral airspace spiculated opacities, the largest in the right lower lobe measuring up to 6.0 cm.  Concerning for possible malignancy, ground glass opacities w/t bilateral small pleural effusions   Patient w/t + 10-15lb weight loss, general fatigue/ malaise Ruling out malignancy  Plan:   - Pulm consult  - continuos pulse oximetry  - effusions too small for thoracentesis and patient is asymptomatic

## 2022-02-17 NOTE — PATIENT PROFILE ADULT - FALL HARM RISK - DEVICES
Discharge Summary    Date of admission: 3/20/2021    Date of discharge: 3/23/2021    Final diagnosis:    1.  Prolonged ileus    Procedures performed during admission:    1.  None    Consulting physicians:    1.  None    Reason for admission:    The patient had a laparoscopic appendectomy for a gangrenous appendicitis on 1/24/2021 at an outlying facility.  He then developed a prolonged postoperative ileus that was being managed conservatively.  A PICC line was placed and there were plans for TPN but his mother was frustrated and left that facility and presented to [facility] where he was admitted for management of the ileus.    Hospital course:    The patient was admitted on 3/20/2021 and continued with conservative management of the ileus.  He was not having any nausea or vomiting so he was started on a diet.  He was also given doses of milk of magnesia to try to stimulate his GI tract.  He had been on consistent narcotics and was advised to decrease his narcotic use to allow return of bowel function.  Toradol was used with good results.  He was up and ambulating very frequently.  Within a day he began passing some flatus and having signs of bowel function.  His diet was advanced and he tolerated this well.  By 3/23/2021 he was afebrile and hemodynamically stable.  His abdomen is soft and nondistended.  He was passing frequent flatus and had 2 bowel movements.  He was felt ready for discharge home.    Prescriptions:    He was given a prescription for Percocet.    Follow-up:    He will follow-up with me in 2 weeks.    Jorge Shin Jr., M.D.   [facility]

## 2022-02-17 NOTE — H&P ADULT - NSHPSOCIALHISTORY_GEN_ALL_CORE
Marital Status:  (   )    (   ) Single    (   )    (  )   Lives with: (  ) alone  (  ) children   (  ) spouse   (  ) parents  (  ) other  Recent Travel: No recent travel  Occupation:  Mobility:   Funcational status:  Substance Use (street drugs): ( x ) never used  (  ) other:  Tobacco Usage:  ( x  ) never smoked   (   ) former smoker   (   ) current smoker  (     ) pack year  Alcohol Usage: None Religious; NO Transfusions  Marital Status:  (   )    (   ) Single    (   )    (  )   Lives with: (  ) alone  (  ) children   (  ) spouse   (  ) parents  (  ) other  Recent Travel: No recent travel  Occupation:  Mobility:   Funcational status:  Substance Use (street drugs): ( x ) never used  (  ) other:  Tobacco Usage:  ( x  ) never smoked   (   ) former smoker   (   ) current smoker  (     ) pack year  Alcohol Usage: None

## 2022-02-17 NOTE — H&P ADULT - NSHPLABSRESULTS_GEN_ALL_CORE
LABS:                        7.8    20.17 )-----------( 653      ( 17 Feb 2022 04:38 )             25.5     02-17    132<L>  |  98  |  26<H>  ----------------------------<  129<H>  4.5   |  20<L>  |  1.29    Ca    9.3      17 Feb 2022 04:38    TPro  7.5  /  Alb  3.0<L>  /  TBili  0.4  /  DBili  x   /  AST  34  /  ALT  28  /  AlkPhos  90  02-17 LABS:                        7.8    20.17 )-----------( 653      ( 17 Feb 2022 04:38 )             25.5     02-17    132<L>  |  98  |  26<H>  ----------------------------<  129<H>  4.5   |  20<L>  |  1.29    Ca    9.3      17 Feb 2022 04:38    TPro  7.5  /  Alb  3.0<L>  /  TBili  0.4  /  DBili  x   /  AST  34  /  ALT  28  /  AlkPhos  90  02-17    CT Abdomen/ Chest  IMPRESSION:  Bilateral airspace spiculated opacities, the largest in the right lower lobe measuring up to 6.0 cm. These findings are concerning for primary versus metastatic neoplasm.    Nonspecific bilateral upper lobe predominant groundglass opacities.    Small bilateral pleural effusions.    Lower Extremity doppler  IMPRESSION:    Deep venous thrombosis in muscular branches to the soleus bilaterally and   in the right peroneal vein.    A preliminary report was given by the technologist to Dr. Oseguera at 9:00   AM on 2/17/2022.    --- End of Report ---

## 2022-02-17 NOTE — CHART NOTE - NSCHARTNOTEFT_GEN_A_CORE
Pt declines Therapeutic Heparin drip/ Therapeutic anticoagulation in the setting of known bilateral DVT on VA Dopplers.   Risks and benefits of Heparin drip with appropriate dosing by weight, with adjustments by normogram were explained to daughter, [phone] extensively. Patient and daughter decline Heparin drip at this time.     Will order Heparin subcutaneous 5,000u subcutaneous Q8. Daughter explained that this is suboptimal treatment; however, will be ordered for the patient's freedom to accept or refuse.     Janneth Curran MD  PGY1 Internal Medicine  [phone] Pt declines Therapeutic Heparin drip/ Therapeutic anticoagulation in the setting of known bilateral DVT on VA Dopplers.   Risks and benefits of Heparin drip with appropriate dosing by weight, with adjustments by normogram were explained to daughter, [phone] extensively. Patient and daughter decline Heparin drip at this time.     Will order Heparin subcutaneous 5,000u subcutaneous Q8. Daughter was explained that this is suboptimal treatment; however, will be ordered for the patient's freedom to accept or refuse.     Janneth Curran MD  PGY1 Internal Medicine  [phone]

## 2022-02-17 NOTE — H&P ADULT - PROBLEM SELECTOR PLAN 2
Complicated Rectovaginal fistula s/p Hysterectomy for Uterine prolapse 5 years ago LE swelling 2/2/ Right peroneal vein on VA Doppler  HAS-BLED 4    Plan: LE swelling 2/2/ Right peroneal vein on VA Doppler  HAS-BLED 4    Plan:  - Hold off AC now i/s/o Anemia, and Jehova's witness  - CBC, coags daily LE swelling 2/2 Bilateral DVT's in bilateral Soleus and Right peroneal vein on VA Doppler  Unable to R/o PE without CT chest IV contrast. the ProBNP is elevated   HAS-BLED 4, Anemia, and Jehova's witness; however benefits outweigh risks to start Heparin drip, With NO bolus per Vascular Cardiology     Plan:  - Per Vasc Cards, start Heparin Drip, weight based with ptt normogram  - Ptt goal 50-70 as per Vascular Cardiology  - CT chest w/ IV contrast if patient amenable  - Echo to r/o Right heart strain   - CBC, coags daily  - LFT's

## 2022-02-17 NOTE — H&P ADULT - PROBLEM SELECTOR PLAN 1
wbc 20 with increased neutrophils, thrombocytosis. Tachycardia. Most likely 2/2 infection given hx malaise and antibiotics course i/s/o Rectovaginal fistula (possible genitourinary infection) versus new primary malignancy (CT with new RLL opacities) wbc 20 with increased neutrophils, thrombocytosis. Tachycardia. Most likely 2/2 infection given hx malaise and antibiotics course i/s/o Rectovaginal fistula (possible genitourinary infection) versus new primary malignancy (CT with new RLL opacities)  ECG wnl  Plan:  - c/w Empiric Zosyn  - f/u blood Cx  - f/u Urine sample, though will likely be contaminated  - f/u fever curve, if febrile repeat cultures  - wbc 20 with increased neutrophils, thrombocytosis. Tachycardia. Most likely 2/2 infection given hx malaise and antibiotics course i/s/o Rectovaginal fistula (possible genitourinary infection) versus new primary malignancy (CT with new RLL opacities)  ECG wnl  Plan:  - c/w Empiric Zosyn  - f/u blood Cx  - f/u Urine sample, though will likely be contaminated  - f/u fever curve, if febrile repeat cultures  - Encourage po intake

## 2022-02-17 NOTE — CONSULT NOTE ADULT - ASSESSMENT
#Spiculated opacities; multiple; bilateral; largest 6x3 cm in RLL  #Ground glass opacities  #Small b/l pleural effusion  #Bilateral soleus vein dvts and right peroneal vein dvt      *Note is not complete unless signed by the attending    Dr. Braulio Pérez, DO  Pulmonary and Critical Care Medicine Fellow   Available via Microsoft Teams - **Preferred**  Pulmonary Spectra #[id number] ([facility]) / [id number] ([facility])  Pager:  [phone] 81F [employer] PMHx asthma, uterine prolapse s/p hysterectomy c/b rectovaginal fistula here with lower extremity swelling for 1 week. Found to have multiple, bilateral, spiculated opacities concerning for malignancy, largest 6x3 cm in the RLL. Also noted to have bilateral soleus vein dvts and right peroneal vein dvt likely provoked from presumed malignancy. Patient reports a normal CT scan 6 months ago which may suggest a more acute infectious process. COLEEN lesion may be partially cavitary as well.     #Spiculated opacities; multiple; bilateral; largest 6x3 cm in RLL  #Ground glass opacities  #Small b/l pleural effusion  #Bilateral soleus vein dvts and right peroneal vein dvt    Recommendations  -Case discussed with team who notified us that the patient and daughter have been refusing basic interventions such as TTE. GOC is on going.   -Bronchoscopy would be the likely means of tissue diagnosis however its unclear if patient/daughter would want to pursue, let alone start antineoplastic therapy if malignancy is detected by biopsy.  -Please obtain outpatient CT chest for comparison.  -Agree with zosyn iv.  -Favor starting renally dosed vancomycin iv as certain lesions may be partially cavitary.   -Bcx, rvp, mrsa swab, urine legionella ag, urine strep ag.  -Agree with heparin drip recommended by vascular cards. Patient refusing contrast enhanced CTC.   -Pulmonary with follow.    Dr. Braulio Pérez, DO  Pulmonary and Critical Care Medicine Fellow   Available via Microsoft Teams - **Preferred**  Pulmonary Spectra #[id number] ([facility]) / [id number] ([facility])  Pager:  [phone]

## 2022-02-17 NOTE — CONSULT NOTE ADULT - SUBJECTIVE AND OBJECTIVE BOX
PULMONARY SERVICE INITIAL CONSULT NOTE    HPI:  ""81F [employer] PMHx asthma, uterine prolapse s/p hysterectomy c/b rectovaginal fistula here with multiple acute and chronic complaints. p/w lower extremity swelling, abdominal pain, fatigue, weight loss, decreased PO intake. Per pt's daughter, pt has had dec PO appetite, dec energy  and weight loss for a few months. About a week ago, pt developed "URI symptoms" per daughter consisting of nasal congestion, generalized malaise. Saw an outpatient provider who prescribed amoxicillin and flagyl which pt took for 2-3 days. Daughter states she had pt stop abx 2 days ago when she noted swelling in her legs B/L which she attributed to medication. Gave pt a "herbal diuretic" which did not reveal progressive swelling prompting daughter to bring pt to the E.D. Denies associated chest pain or SOB- though states she has to sometimes take deep breaths due to her asthma. Sensation has not worsened from baseline.""         REVIEW OF SYSTEMS:  All additional ROS negative.    PAST MEDICAL & SURGICAL HISTORY:  Asthma    History of hysterectomy        FAMILY HISTORY:      SOCIAL HISTORY:  Smoking Status: [ ] Current, [ ] Former, [ ] Never  Pack Years:    MEDICATIONS:  Pulmonary:    Antimicrobials:    Anticoagulants:    Onc:    GI/:    Endocrine:    Cardiac:    Other Medications:      Allergies    contrast media (iodine-based) (Other (Moderate))    Intolerances        PHYSICAL EXAM  Vital Signs Last 24 Hrs  T(C): 36.7 (17 Feb 2022 12:34), Max: 36.7 (17 Feb 2022 11:30)  T(F): 98.1 (17 Feb 2022 12:34), Max: 98.1 (17 Feb 2022 12:34)  HR: 111 (17 Feb 2022 12:34) (91 - 128)  BP: 134/55 (17 Feb 2022 12:34) (111/70 - 166/88)  BP(mean): --  RR: 18 (17 Feb 2022 12:34) (18 - 20)  SpO2: 99% (17 Feb 2022 12:34) (95% - 100%)        CONSTITUTIONAL: No acute distress.   HEENT:  Conjunctiva clear B/L.  Moist oral mucosa.   Cardiovascular: RRR with no murmurs. No JVD noted. No lower extremity edema B/L. Extremities are warm and well perfused.    Respiratory: Lungs CTAB. No wrr. No accessory muscle use.   Gastrointestinal:  Soft, nontender. Non-distended. Non-rigid.    Neurologic:  Alert and awake. Moving all extremities. Following commands.    Skin:  No gross rashes notes.      LABS:      CBC Full  -  ( 17 Feb 2022 04:38 )  WBC Count : 20.17 K/uL  RBC Count : 3.29 M/uL  Hemoglobin : 7.8 g/dL  Hematocrit : 25.5 %  Platelet Count - Automated : 653 K/uL  Mean Cell Volume : 77.5 fl  Mean Cell Hemoglobin : 23.7 pg  Mean Cell Hemoglobin Concentration : 30.6 gm/dL  Auto Neutrophil # : 16.50 K/uL  Auto Lymphocyte # : 1.41 K/uL  Auto Monocyte # : 1.57 K/uL  Auto Eosinophil # : 0.34 K/uL  Auto Basophil # : 0.00 K/uL  Auto Neutrophil % : 81.8 %  Auto Lymphocyte % : 7.0 %  Auto Monocyte % : 7.8 %  Auto Eosinophil % : 1.7 %  Auto Basophil % : 0.0 %    02-17    132<L>  |  98  |  26<H>  ----------------------------<  129<H>  4.5   |  20<L>  |  1.29    Ca    9.3      17 Feb 2022 04:38    TPro  7.5  /  Alb  3.0<L>  /  TBili  0.4  /  DBili  x   /  AST  34  /  ALT  28  /  AlkPhos  90  02-17    PT/INR - ( 17 Feb 2022 11:13 )   PT: 16.3 sec;   INR: 1.38 ratio         PTT - ( 17 Feb 2022 11:13 )  PTT:21.9 sec                  RADIOLOGY & ADDITIONAL STUDIES: PULMONARY SERVICE INITIAL CONSULT NOTE    HPI:  History obtained from primary team and patient. Called daughter however no answer.   "81F [employer] PMHx asthma, uterine prolapse s/p hysterectomy c/b rectovaginal fistula here with lower extremity swelling for 1 week. She reports progressive weight loss and loss of appetite for the past few months. Reports URI like symptoms of nasal congestion for the past few days. Her outpatient provider prescribed amoxicillin and flagyl which pt took for 2-3 days but she did not improve. Patient reports that she has had a CT scan of her chest 6 months ago on a "[address]" which was "normal." No other complaints.     REVIEW OF SYSTEMS:  Negative except for above.    PAST MEDICAL & SURGICAL HISTORY:  Asthma    History of hysterectomy    FAMILY HISTORY:  Denies FHx of lung disease or cancer.     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     MEDICATIONS:  Pulmonary:    Antimicrobials:    Anticoagulants:    Onc:    GI/:    Endocrine:    Cardiac:    Other Medications:      Allergies    contrast media (iodine-based) (Other (Moderate))    Intolerances        PHYSICAL EXAM  Vital Signs Last 24 Hrs  T(C): 36.7 (17 Feb 2022 12:34), Max: 36.7 (17 Feb 2022 11:30)  T(F): 98.1 (17 Feb 2022 12:34), Max: 98.1 (17 Feb 2022 12:34)  HR: 111 (17 Feb 2022 12:34) (91 - 128)  BP: 134/55 (17 Feb 2022 12:34) (111/70 - 166/88)  BP(mean): --  RR: 18 (17 Feb 2022 12:34) (18 - 20)  SpO2: 99% (17 Feb 2022 12:34) (95% - 100%)        CONSTITUTIONAL: No acute distress.   HEENT:  Conjunctiva clear B/L.  Moist oral mucosa.   Cardiovascular: RRR with no murmurs. No JVD noted. 2+ lower extremity edema B/L. Extremities are warm and well perfused.    Respiratory: Lungs CTAB. No wrr. No accessory muscle use.   Gastrointestinal:  Soft, nontender. Non-distended. Non-rigid.    Neurologic:  Alert and awake. Moving all extremities. Following commands.    Skin:  No gross rashes notes.      LABS:      CBC Full  -  ( 17 Feb 2022 04:38 )  WBC Count : 20.17 K/uL  RBC Count : 3.29 M/uL  Hemoglobin : 7.8 g/dL  Hematocrit : 25.5 %  Platelet Count - Automated : 653 K/uL  Mean Cell Volume : 77.5 fl  Mean Cell Hemoglobin : 23.7 pg  Mean Cell Hemoglobin Concentration : 30.6 gm/dL  Auto Neutrophil # : 16.50 K/uL  Auto Lymphocyte # : 1.41 K/uL  Auto Monocyte # : 1.57 K/uL  Auto Eosinophil # : 0.34 K/uL  Auto Basophil # : 0.00 K/uL  Auto Neutrophil % : 81.8 %  Auto Lymphocyte % : 7.0 %  Auto Monocyte % : 7.8 %  Auto Eosinophil % : 1.7 %  Auto Basophil % : 0.0 %    02-17    132<L>  |  98  |  26<H>  ----------------------------<  129<H>  4.5   |  20<L>  |  1.29    Ca    9.3      17 Feb 2022 04:38    TPro  7.5  /  Alb  3.0<L>  /  TBili  0.4  /  DBili  x   /  AST  34  /  ALT  28  /  AlkPhos  90  02-17    PT/INR - ( 17 Feb 2022 11:13 )   PT: 16.3 sec;   INR: 1.38 ratio         PTT - ( 17 Feb 2022 11:13 )  PTT:21.9 sec                  RADIOLOGY & ADDITIONAL STUDIES:

## 2022-02-17 NOTE — ED ADULT NURSE REASSESSMENT NOTE - NS ED NURSE REASSESS COMMENT FT1
Received pt from previous RN, pt. alert and awake breathing w/ ease on RA in no acute distress. Swelling noted to lower extremities, two sets of blood cultures drawn. Antibiotic infusing, daughter at bedside. Awaiting admission, nursing care continues.

## 2022-02-17 NOTE — H&P ADULT - HISTORY OF PRESENT ILLNESS
80 y/o F [employer] hx of asthma, uterine prolapse req hysterectomy c/b rectovaginal fistula p/w lower extremity swelling, abdominal pain, fatigue, weight loss, decreased PO intake. Per pt's daughter, pt has had dec PO appetite, dec energy  and weight loss for a few months. About a week ago, pt developed "URI symptoms" per daughter consisting of nasal congestion, generalized malaise. Saw an outpatient provider who prescribed amoxicillin and flagyl which pt took for 2-3 days. Daughter states she had pt stop abx 2 days ago when she noted swelling in her legs B/L which she attributed to medication. Gave pt a "herbal diuretic" which did not reveal progressive swelling prompting daughter to bring pt to the E.D. Denies associated chest pain or SOB- though states she has to sometimes take deep breaths due to her asthma. Sensation has not worsened from baseline.     ED course:   -128 's-160s systolic, afebrile  2 doses zosyn  500cc's fluids     80 y/o F [employer] hx of asthma, uterine prolapse req hysterectomy c/b rectovaginal fistula p/w lower extremity swelling, abdominal pain, fatigue, weight loss, decreased PO intake. Per pt's daughter, pt has had dec PO appetite, dec energy  and weight loss for a few months. About a week ago, pt developed "URI symptoms" per daughter consisting of nasal congestion, generalized malaise. Saw an outpatient provider who prescribed amoxicillin and flagyl which pt took for 2-3 days. Daughter states she had pt stop abx 2 days ago when she noted swelling in her legs B/L which she attributed to medication. Gave pt a "herbal diuretic" which did not reveal progressive swelling prompting daughter to bring pt to the E.D. Denies associated chest pain or SOB- though states she has to sometimes take deep breaths due to her asthma. Sensation has not worsened from baseline.     Attempted to contact daughter per number in chart.  ED course:   -128 's-160s systolic, afebrile  2 doses zosyn  500cc's fluids

## 2022-02-17 NOTE — ED PROVIDER NOTE - PROGRESS NOTE DETAILS
Recommended CTA/CT A/P to r/o PE and evaluate for infection/inflammation given initial blood work w/ sig leukocytosis. Daughter and patient refusing contrast 2/2 episode 3 years ago when pt was found to have an allergic reaction to contrast (lip/face/chest swelling) that took weeks to resolve. Refusing attempts at pre-medication prior to contrast. Risks of not proceeding w/ studies discussed w/ daughter, will obtain Non-con CT C/A/P  per family request Marisol, PGY-3: Signout received on this patient. Unclear etiology--WBC 20, tachy, abd discomfort, known rectovaginal fistula. Obtain cultures, start ax, pan-scan (pt declined contrast 2/2 allergy), DVT, added trop, likely admission. Marisol, PGY-3: Pt adds that sometimes she notes red streaks in her stool, but no melena, abd pain, AC's at this time. Marisol, PGY-3: Pt adds that sometimes she notes red streaks in her stool, but no melena, abd pain, AC's at this time. Per hospitalist, Hb dropped in 1 month from 14-->7.8, however when checked HIE, Hb 14 was actually from 2021. However given this decline and symptoms, will give 1U pRBC, hospitalist states they may start AC's on the floors.

## 2022-02-17 NOTE — ED PROVIDER NOTE - CLINICAL SUMMARY MEDICAL DECISION MAKING FREE TEXT BOX
Pt w hx as above here w constellation of symptoms including BLE swelling, decreased energy/fatigue, uri sx without cp/sob. on exam, pt tachycardic and tachypneic and hypoxic. broad workup will be done to r/o pulm edema vs new onset chf vs pna or other source of sepsis vs PE. pt will require admission. pt to get broad spectrum abx to cover any potential infectious etiology.

## 2022-02-17 NOTE — PATIENT PROFILE ADULT - FALL HARM RISK - RISK INTERVENTIONS

## 2022-02-17 NOTE — ED ADULT NURSE NOTE - OBJECTIVE STATEMENT
Patient is a 81y female presenting to the ED ambulatory from home with c/o swelling of bilateral lower extremities. Patient A&Ox4. Patient reports developing worsening edema of bilateral lower extremities starting 3 days ago. Reports pain in bilateral lower extremities which worsens with ambulation and palpation. Displays +2 pitting edema of bilateral lower extremities. Displays a laceration on the right lower calf. Patient's daughter reports the patient tripped and fell today landing on her knees; displays an abrasion to the right knee and redness to the left knee. Denies hitting her head, denies LOC, denies use of anticoagulation. Respirations spontaneous, even, and non-labored. Abdomens soft, non-distended and non-tender upon palpation. Denies chest pain, SOB, headache, N/V/D, abdominal pain, fever/chills, burning upon urination or difficulty urinating.

## 2022-02-17 NOTE — H&P ADULT - ATTENDING COMMENTS
81F [employer] pw LE swelling and weakness  Imaging suspicious for primary lung ca  Also with new microcytic anemia  Found to have bilateral DVT  Unable to obtain CTPE due to contrast allergy  Pt refused echo  Currently refusing heparin gtt  Need to clarify GOC

## 2022-02-17 NOTE — H&P ADULT - PROBLEM SELECTOR PLAN 5
Complicated Rectovaginal fistula s/p Hysterectomy for Uterine prolapse 5 years ago  Pt with +ve vaginal fecal materal  Plan:   - c/w Zosyn 3.3.75 Q8  - f/u urinalysis and urine culture  - monitor for signs of uti, fever curve, trend wbc

## 2022-02-17 NOTE — H&P ADULT - PROBLEM SELECTOR PLAN 6
Hgb 8, decreased from 14 one year ago.   Hgb drop concerning for Fe deficiency versus Anemia of Chronic disease  Plan:   - Fu ferritin, TIBC, Iron serum  - Fu LDH, haptoglobin  - CBC QD  - MAintain active type and screen  - Transfuse if Hgb<7

## 2022-02-17 NOTE — DISCHARGE NOTE PROVIDER - HOSPITAL COURSE
82 y/o F Moravian hx of asthma, uterine prolapse req hysterectomy c/b rectovaginal fistula p/w lower extremity swelling, abdominal pain, fatigue, weight loss, decreased PO intake. Per pt's daughter, pt has had dec PO appetite, dec energy  and weight loss for a few months. About a week ago, pt developed "URI symptoms" per daughter consisting of nasal congestion, generalized malaise. Saw an outpatient provider who prescribed amoxicillin and flagyl which pt took for 2-3 days. Daughter states she had pt stop abx 2 days ago when she noted swelling in her legs B/L which she attributed to medication. Gave pt a "herbal diuretic" which did not reveal progressive swelling prompting daughter to bring pt to the E.D. Denies associated chest pain or SOB- though states she has to sometimes take deep breaths due to her asthma. Sensation has not worsened from baseline.     Attempted to contact daughter per number in chart.  ED course:   -128 's-160s systolic, afebrile  2 doses zosyn  500cc's fluids    Pt found with CT findings of spiculated lung nodules concerning for malignancy, the largest lesion measuring 6X3cm.   Pt found with bilateral DVT's on [facility] Doppler. 80 y/o F U[employer] hx of asthma, uterine prolapse req hysterectomy c/b rectovaginal fistula p/w lower extremity swelling, abdominal pain, fatigue, weight loss, decreased PO intake. Per pt's daughter, pt has had dec PO appetite, dec energy  and weight loss for a few months. About a week ago, pt developed "URI symptoms" per daughter consisting of nasal congestion, generalized malaise. Saw an outpatient provider who prescribed amoxicillin and flagyl which pt took for 2-3 days. Daughter states she had pt stop abx 2 days ago when she noted swelling in her legs B/L which she attributed to medication. Gave pt a "herbal diuretic" which did not reveal progressive swelling prompting daughter to bring pt to the E.D. Denies associated chest pain or SOB- though states she has to sometimes take deep breaths due to her asthma. Sensation has not worsened from baseline.     Attempted to contact daughter per number in chart.  ED course:   -128 's-160s systolic, afebrile  2 doses zosyn  500cc's fluids    Pt found with CT findings of spiculated lung nodules concerning for malignancy, the largest lesion measuring 6X3cm.   Pt found with bilateral DVT's on [facility] Doppler.     Hospital Course:   Patient was counseled extensively regarding risks and benefits of starting a heparin drip for therapeutic dose anticoagulation in the setting of confirmed DVT and suspected pulmonary embolus, but patient and daughter refused. Patient was treated empirically with zosyn.   Patient refused PT evaluation. 82 y/o F Islam hx of asthma, uterine prolapse req hysterectomy c/b rectovaginal fistula p/w lower extremity swelling, abdominal pain, fatigue, weight loss, decreased PO intake. Per pt's daughter, pt has had dec PO appetite, dec energy  and weight loss for a few months. About a week ago, pt developed "URI symptoms" per daughter consisting of nasal congestion, generalized malaise. Saw an outpatient provider who prescribed amoxicillin and flagyl which pt took for 2-3 days. Daughter states she had pt stop abx 2 days ago when she noted swelling in her legs B/L which she attributed to medication. Gave pt a "herbal diuretic" which did not reveal progressive swelling prompting daughter to bring pt to the E.D. Denies associated chest pain or SOB- though states she has to sometimes take deep breaths due to her asthma. Sensation has not worsened from baseline.     Attempted to contact daughter per number in chart.  ED course:   -128 's-160s systolic, afebrile  2 doses zosyn  500cc's fluids    Pt found with CT findings of spiculated lung nodules concerning for malignancy, the largest lesion measuring 6X3cm.   Pt found with bilateral DVT's on [facility] Doppler.     Hospital Course:   Patient was counseled extensively regarding risks and benefits of starting a heparin drip for therapeutic dose anticoagulation in the setting of confirmed DVT in muscular branches to soleus bilaterally and in the right peroneal vein, and suspected pulmonary embolus, but patient and daughter refused therapeutic anticoagulation. Patient also refused to get CTA chest to rule out PE, given stated contrast allergy, was counseled regarding pre-treatment with contrast use, but still refused. Vascular Cardiology and Pulmonary teams were consulted, but patient declined offered treatments. Course complicated by anemia, however patient is a Islam and declines blood transfusion. Patient started on IV iron and will transition to oral iron after discharge. Patient met SIRS criteria without clear source of infections, and was treated empirically with zosyn, with blood cultures NGTD, plan to transition to PO Ciprofloxacin to complete 7 day course of antibiotics.  Patient and daughter choose to leave the hospital against medical advice on 2/20/2022, despite extensive conversatins of the medical risks associated with leaving without the recommended treatments. Plan to complete antibiotic course with Ciprofloxacin, continue with iron pills, with recommendations to follow up with PCP, [facility] and GYN. 80 y/o F Christianity hx of asthma, uterine prolapse req hysterectomy c/b rectovaginal fistula p/w lower extremity swelling, abdominal pain, fatigue, weight loss, decreased PO intake. Per pt's daughter, pt has had dec PO appetite, dec energy  and weight loss for a few months. About a week ago, pt developed "URI symptoms" per daughter consisting of nasal congestion, generalized malaise. Saw an outpatient provider who prescribed amoxicillin and flagyl which pt took for 2-3 days. Daughter states she had pt stop abx 2 days ago when she noted swelling in her legs B/L which she attributed to medication. Gave pt a "herbal diuretic" which did not reveal progressive swelling prompting daughter to bring pt to the E.D. Denies associated chest pain or SOB- though states she has to sometimes take deep breaths due to her asthma. Sensation has not worsened from baseline.     Attempted to contact daughter per number in chart.  ED course:   -128 's-160s systolic, afebrile  2 doses zosyn  500cc's fluids    Pt found with CT findings of spiculated lung nodules concerning for malignancy, the largest lesion measuring 6X3cm.   Pt found with bilateral DVT's on VA Doppler.     Hospital Course:   Patient was counseled extensively regarding risks and benefits of starting a heparin drip for therapeutic dose anticoagulation in the setting of confirmed DVT in muscular branches to soleus bilaterally and in the right peroneal vein, and suspected pulmonary embolus, but patient and daughter refused therapeutic anticoagulation. Patient also refused to get CTA chest to rule out PE, given stated contrast allergy, was counseled regarding pre-treatment with contrast use, but still refused. Vascular Cardiology and Pulmonary teams were consulted, but patient declined offered treatments. Course complicated by anemia, however patient is a Christianity and declines blood transfusion. Patient started on IV iron and will transition to oral iron after discharge. Patient met SIRS criteria without clear source of infections, and was treated empirically with zosyn, with blood cultures NGTD, plan to transition to PO Ciprofloxacin to complete 7 day course of antibiotics.    Patient and daughter choose to leave the hospital against medical advice on 2/20/2022, despite extensive conversatins of the medical risks associated with leaving without the recommended treatments. Plan to complete antibiotic course with Ciprofloxacin, continue with iron pills, with recommendations to follow up with PCP, [facility] and GYN. 80 y/o F Anglican hx of asthma, uterine prolapse req hysterectomy c/b rectovaginal fistula p/w lower extremity swelling, abdominal pain, fatigue, weight loss, decreased PO intake. Per pt's daughter, pt has had dec PO appetite, dec energy  and weight loss for a few months. About a week ago, pt developed "URI symptoms" per daughter consisting of nasal congestion, generalized malaise. Saw an outpatient provider who prescribed amoxicillin and flagyl which pt took for 2-3 days. Daughter states she had pt stop abx 2 days ago when she noted swelling in her legs B/L which she attributed to medication. Gave pt a "herbal diuretic" which did not reveal progressive swelling prompting daughter to bring pt to the E.D. Denies associated chest pain or SOB- though states she has to sometimes take deep breaths due to her asthma. Sensation has not worsened from baseline.     Attempted to contact daughter per number in chart.  ED course:   -128 's-160s systolic, afebrile  2 doses zosyn  500cc's fluids    Pt found with CT findings of spiculated lung nodules concerning for malignancy, the largest lesion measuring 6X3cm.   Pt found with bilateral DVT's on [facility] Doppler.     Hospital Course:   Patient was counseled extensively regarding risks and benefits of starting a heparin drip for therapeutic dose anticoagulation in the setting of confirmed DVT in muscular branches to soleus bilaterally and in the right peroneal vein, and suspected pulmonary embolus, but patient and daughter refused therapeutic anticoagulation. Patient also refused to get CTA chest to rule out PE, given stated contrast allergy, was counseled regarding pre-treatment with contrast use, but still refused. Vascular Cardiology and Pulmonary teams were consulted, but patient declined offered treatments. Course complicated by anemia, however patient is a Anglican and declines blood transfusion. Patient started on IV iron and will transition to oral iron after discharge. Patient met SIRS criteria without clear source of infections, and was treated empirically with zosyn, with blood cultures NGTD, plan to transition to PO Ciprofloxacin to complete 7 day course of antibiotics.    NOTE :Patient and daughter choose to leave the hospital against medical advice on 2/20/2022, and refused to sign AMA form, despite extensive conversations of the medical risks associated with leaving without the recommended treatments.     Verbally discussed plans to complete antibiotic course with Ciprofloxacin, continue with iron pills, with recommendations to follow up with PCP, [facility] and GYN.

## 2022-02-17 NOTE — H&P ADULT - NSICDXPASTSURGICALHX_GEN_ALL_CORE_FT
PAST SURGICAL HISTORY:  History of hysterectomy s/p Hysterectomy 5 years ago. cb Rectovaginal fistula

## 2022-02-17 NOTE — ED ADULT NURSE NOTE - NSIMPLEMENTINTERV_GEN_ALL_ED
Implemented All Fall Risk Interventions:  [place] to call system. Call bell, personal items and telephone within reach. Instruct patient to call for assistance. Room bathroom lighting operational. Non-slip footwear when patient is off stretcher. Physically safe environment: no spills, clutter or unnecessary equipment. Stretcher in lowest position, wheels locked, appropriate side rails in place. Provide visual cue, wrist band, yellow gown, etc. Monitor gait and stability. Monitor for mental status changes and reorient to person, place, and time. Review medications for side effects contributing to fall risk. Reinforce activity limits and safety measures with patient and family.

## 2022-02-17 NOTE — DISCHARGE NOTE PROVIDER - NSDCCPCAREPLAN_GEN_ALL_CORE_FT
PRINCIPAL DISCHARGE DIAGNOSIS  Diagnosis: Shortness of breath  Assessment and Plan of Treatment: You came to the hospital with shortness of breath and leg swelling. You had a CT scan of your chest which was concerning for possible lung cancer. You aso had a duplex ultrasound of your legs which showed you had blood clots in your legs. There was a concern that you also likely have a blood clot in your lung. We discussed the recommendatin to obtain a CT scan of your chest with contrast to check for a ossible pulmonary embolism or blood clot in the lung, but you declined. We also discussed the option to pre-medicate for the contrast, which can still be performed, but you declined. We also recommended to start on a heparin drip for therapeutic dose anticoagulation to treat the blood clots, but you declined. The Vascular Cardiology and Pulmonary team were constuled and also recommended the heparin drip. The risks and benefits of starting the heparin drip blood thinner was discussed with you and your daughter, and you declined this treatment. You were given supplemental oxygen through a nasal cannula which helped with your breathing.    - We recommend you obtain a CT angio of your chest to check for a pulmonary embolism or blood clot in the lung  - We recommend you follow up with at the [facility] for ongoing plans for the concerning CT scan of the chest which was concerning for lung cancer       PRINCIPAL DISCHARGE DIAGNOSIS  Diagnosis: Shortness of breath  Assessment and Plan of Treatment: You came to the hospital with shortness of breath and leg swelling. You had a CT scan of your chest which was concerning for possible lung cancer. You aso had a duplex ultrasound of your legs which showed you had blood clots in your legs. There was a concern that you also likely have a blood clot in your lung. We discussed the recommendatin to obtain a CT scan of your chest with contrast to check for a ossible pulmonary embolism or blood clot in the lung, but you declined. We also discussed the option to pre-medicate for the contrast, which can still be performed, but you declined. We also recommended to start on a heparin drip for therapeutic dose anticoagulation to treat the blood clots, but you declined. The Vascular Cardiology and Pulmonary team were constuled and also recommended the heparin drip. The risks and benefits of starting the heparin drip blood thinner was discussed with you and your daughter, and you declined this treatment. You were given supplemental oxygen through a nasal cannula which helped with your breathing.    - You chose to leave the hospital against medical advice on 2/20/22  - We recommend you obtain a CT angio of your chest to check for a pulmonary embolism or blood clot in the lung  - We recommend you follow up with at the [email or web address] for ongoing plans for the concerning CT scan of the chest which was concerning for lung cancer  - We recommend you complete a 7 day course of antibiotics with Ciprofloxacin as prescribed (sent to your pharmacy)      SECONDARY DISCHARGE DIAGNOSES  Diagnosis: Anemia  Assessment and Plan of Treatment: You were found to have low hemoglobin levels in the blood. As a Jainism you declined a blood transfusion, so you were started on an iron infusion.   - You should continue taking iron pills daily at home. Iron pills were sent to your [facility]     PRINCIPAL DISCHARGE DIAGNOSIS  Diagnosis: Shortness of breath  Assessment and Plan of Treatment: You came to the hospital with shortness of breath and leg swelling. You had a CT scan of your chest which was concerning for possible lung cancer. You aso had a duplex ultrasound of your legs which showed you had blood clots in your legs. There was a concern that you also likely have a blood clot in your lung. We discussed the recommendatin to obtain a CT scan of your chest with contrast to check for a ossible pulmonary embolism or blood clot in the lung, but you declined. We also discussed the option to pre-medicate for the contrast, which can still be performed, but you declined. We also recommended to start on a heparin drip for therapeutic dose anticoagulation to treat the blood clots, but you declined. The Vascular Cardiology and Pulmonary team were constuled and also recommended the heparin drip. The risks and benefits of starting the heparin drip blood thinner was discussed with you and your daughter, and you declined this treatment. You were given supplemental oxygen through a nasal cannula which helped with your breathing.    - You chose to leave the hospital against medical advice on 2/20/22  - We recommend you obtain a CT angio of your chest to check for a pulmonary embolism or blood clot in the lung, and discuss option for a filter placement with your PCP  - We recommend you follow up with at the [facility] for ongoing plans for the concerning CT scan of the chest which was concerning for lung cancer  - We recommend you complete a 7 day course of antibiotics with Ciprofloxacin as prescribed (sent to your pharmacy)      SECONDARY DISCHARGE DIAGNOSES  Diagnosis: Anemia  Assessment and Plan of Treatment: You were found to have low hemoglobin levels in the blood. As a Jainism you declined a blood transfusion, so you were started on an iron infusion.   - You should continue taking iron pills daily at home. Iron pills were sent to your [facility]

## 2022-02-17 NOTE — H&P ADULT - PROBLEM SELECTOR PLAN 4
Complicated Rectovaginal fistula s/p Hysterectomy for Uterine prolapse 5 years ago TSH 9.  With symptoms arguably consistent with Hypothyroidism: tiredeness and weight loss   Plan:   - f/u repeat TSH in the am  - f/u free T4  - continue to monitor

## 2022-02-17 NOTE — H&P ADULT - PROBLEM SELECTOR PLAN 7
Anticoagulation: Start Heparin drip no bolus. If patient declines Heparin drip, start Heparin 5,00 subc Q8  Diet: Regular  Dispo: pending PT and Family meeting

## 2022-02-17 NOTE — H&P ADULT - ASSESSMENT
UTI versus primary malignancy,  82 y/o F Hindu hx of asthma, uterine prolapse req hysterectomy c/b rectovaginal fistula p/w lower extremity swelling and lethargy, found to have DVT, w/t CT  chest, abdomen, pelvis,  imaging consistent with lung malignancy.  80 y/o F Episcopal hx of asthma, uterine prolapse req hysterectomy c/b rectovaginal fistula p/w lower extremity swelling and lethargy, found to have DVT, w/t CT  chest, abdomen, pelvis,  imaging consistent with lung malignancy.

## 2022-02-17 NOTE — DISCHARGE NOTE PROVIDER - DETAILS OF MALNUTRITION DIAGNOSIS/DIAGNOSES
This patient has been assessed with a concern for Malnutrition and was treated during this hospitalization for the following Nutrition diagnosis/diagnoses:     -  02/18/2022: Severe protein-calorie malnutrition

## 2022-02-18 NOTE — PROGRESS NOTE ADULT - PROBLEM SELECTOR PLAN 2
LE swelling 2/2 Bilateral DVT's in bilateral Soleus and Right peroneal vein on [facility] Doppler  Unable to R/o PE without CT chest IV contrast. the ProBNP is elevated   HAS-BLED 4, Anemia, and Jehova's witness; however benefits outweigh risks to start Heparin drip, With NO bolus per Vascular Cardiology     Plan:  - Per Vasc Cards, start Heparin Drip, weight based with ptt normogram  - Ptt goal 50-70 as per Vascular Cardiology  - CT chest w/ IV contrast if patient amenable  - Echo to r/o Right heart strain   - CBC, coags daily  - LFT's LE swelling 2/2 Bilateral DVT's in bilateral Soleus and Right peroneal vein on VA Doppler  Unable to R/o PE without CT chest IV contrast. the ProBNP is elevated   HAS-BLED 4, Anemia, and [employer]; however benefits outweigh risks to start Heparin drip, With NO bolus per Vascular Cardiology   *Patient denies Heparin. Goals of Care Discussion planned for today.   Plan:  - Per Vasc Cards, start Heparin Drip, weight based with ptt normogram  - Ptt goal 50-70 as per Vascular Cardiology  - CT chest w/ IV contrast if patient amenable  - Echo to r/o Right heart strain   - CBC, coags daily  - LFT's Hgb 6.7-->8 (Overnight) and feeling SOB w/t fluctuating saturations , possibly i/s/o possible Malignancy   decreased from 14 one year ago.  Ferritin 507   Hgb drop concerning for Fe deficiency versus Anemia of Chronic disease  Plan:   - IV Fe  - Patient is Jehova's Witness, does not accept blood products, consider asking if patient accepts platelets  - supplemental 02 as needed  - Fu ferritin, TIBC, Iron serum  - Fu LDH, haptoglobin  - CBC QD  - MAintain active type and screen

## 2022-02-18 NOTE — PROVIDER CONTACT NOTE (OTHER) - ACTION/TREATMENT ORDERED:
Continue to monitor pt.
Continue to monitor pt. Pt has possible pulmonary mass and is refusing anticoag tx

## 2022-02-18 NOTE — DIETITIAN INITIAL EVALUATION ADULT. - NSPROEDALEARNPREF_GEN_A_NUR
trauma other than hip fracture far outweighed by issues related to medical care and goals of care
verbal instruction

## 2022-02-18 NOTE — DIETITIAN INITIAL EVALUATION ADULT. - CHIEF COMPLAINT
81y Female "Evangelical with PMHx of asthma, uterine prolapse req hysterectomy c/b rectovaginal fistula p/w lower extremity swelling and lethargy, found to have DVT, w/t CT  chest, abdomen, pelvis,  imaging consistent with lung malignancy. [facility] discussion/ Family meeting planned for this afternoon 2-18."

## 2022-02-18 NOTE — DIETITIAN INITIAL EVALUATION ADULT. - PROBLEM SELECTOR PLAN 2
LE swelling 2/2 Bilateral DVT's in bilateral Soleus and Right peroneal vein on VA Doppler  Unable to R/o PE without CT chest IV contrast. the ProBNP is elevated   HAS-BLED 4, Anemia, and Jehova's witness; however benefits outweigh risks to start Heparin drip, With NO bolus per Vascular Cardiology     Plan:  - Per Vasc Cards, start Heparin Drip, weight based with ptt normogram  - Ptt goal 50-70 as per Vascular Cardiology  - CT chest w/ IV contrast if patient amenable  - Echo to r/o Right heart strain   - CBC, coags daily  - LFT's

## 2022-02-18 NOTE — DIETITIAN INITIAL EVALUATION ADULT. - PROBLEM SELECTOR PLAN 4
TSH 9.  With symptoms arguably consistent with Hypothyroidism: tiredeness and weight loss   Plan:   - f/u repeat TSH in the am  - f/u free T4  - continue to monitor

## 2022-02-18 NOTE — PROGRESS NOTE ADULT - SUBJECTIVE AND OBJECTIVE BOX
Vascular Cardiology  Progress note    SERVICE LINE [phone]              EMAIL [email or web address]   OFFICE [phone]    CC:  LE swelling    INTERVAL HISTORY: patient declined therapeutic heparin     Allergies    contrast media (iodine-based) (Other (Moderate))    Intolerances    	    MEDICATIONS:  heparin   Injectable 5000 Unit(s) SubCutaneous every 8 hours  piperacillin/tazobactam IVPB.. 3.375 Gram(s) IV Intermittent every 8 hours      PAST MEDICAL & SURGICAL HISTORY:  Asthma    Rectovaginal fistula    History of hysterectomy  s/p Hysterectomy 5 years ago. cb Rectovaginal fistula        FAMILY HISTORY:   : unchanged    SOCIAL HISTORY:  unchanged    REVIEW OF SYSTEMS:  CONSTITUTIONAL: No fever, weight loss, or fatigue  EYES: No eye pain, visual disturbances, or discharge  ENMT:  No difficulty hearing, tinnitus, vertigo; No sinus or throat pain  NECK: No pain or stiffness  RESPIRATORY: No cough, wheezing, chills or hemoptysis; No Shortness of Breath  CARDIOVASCULAR: No chest pain, palpitations, passing out, dizziness; lower left ext swelling  GASTROINTESTINAL: No abdominal or epigastric pain. No nausea, vomiting, or hematemesis; No diarrhea or constipation. No melena or hematochezia.  GENITOURINARY: No dysuria, frequency, hematuria, or incontinence  NEUROLOGICAL: No headaches, memory loss, loss of strength, numbness, or tremors  SKIN: No itching, burning, rashes, or lesions   LYMPH Nodes: No enlarged glands  ENDOCRINE: No heat or cold intolerance; No hair loss  MUSCULOSKELETAL: No joint pain or swelling; No muscle, back, or extremity pain  PSYCHIATRIC: No depression, anxiety, mood swings, or difficulty sleeping  HEME/LYMPH: No easy bruising, or bleeding gums  ALLERY AND IMMUNOLOGIC: No hives or eczema	    [ x] All others negative	  [ ] Unable to obtain    PHYSICAL EXAM:  T(C): 36.9 (02-18-22 @ 04:34), Max: 37.4 (02-17-22 @ 20:17)  HR: 97 (02-18-22 @ 04:34) (97 - 117)  BP: 130/63 (02-18-22 @ 04:34) (112/54 - 139/79)  RR: 20 (02-18-22 @ 04:34) (18 - 20)  SpO2: 97% (02-18-22 @ 04:34) (93% - 99%)  Wt(kg): --  I&O's Summary    17 Feb 2022 07:01  -  18 Feb 2022 07:00  --------------------------------------------------------  IN: 100 mL / OUT: 0 mL / NET: 100 mL        Appearance: Normal	  HEENT:   Normal oral mucosa, PERRL, EOMI	  Carotid:  Right: No bruit    Left:  No bruit  Lymphatic: No lymphadenopathy  Cardiovascular: Normal S1 S2, No JVD, No murmurs,   Respiratory: Lungs clear to auscultation	  Psychiatry: A & O x 3,   Gastrointestinal:  Soft, Non-tender, + BS	  Skin: No rashes, No ecchymoses, No cyanosis	  Neurologic: Non-focal  Ext: mild L>R swelling throughout        LABS:	 	    CBC Full  -  ( 18 Feb 2022 06:55 )  WBC Count : 19.71 K/uL  Hemoglobin : 6.7 g/dL  Hematocrit : 22.0 %  Platelet Count - Automated : 565 K/uL  Mean Cell Volume : 78.3 fl  Mean Cell Hemoglobin : 23.8 pg  Mean Cell Hemoglobin Concentration : 30.5 gm/dL  Auto Neutrophil # : 16.65 K/uL  Auto Lymphocyte # : 0.67 K/uL  Auto Monocyte # : 1.54 K/uL  Auto Eosinophil # : 0.51 K/uL  Auto Basophil # : 0.00 K/uL  Auto Neutrophil % : 83.6 %  Auto Lymphocyte % : 3.4 %  Auto Monocyte % : 7.8 %  Auto Eosinophil % : 2.6 %  Auto Basophil % : 0.0 %    02-18    133<L>  |  101  |  22  ----------------------------<  92  5.2   |  20<L>  |  1.33<H>  02-17    132<L>  |  98  |  26<H>  ----------------------------<  129<H>  4.5   |  20<L>  |  1.29    Ca    8.4      18 Feb 2022 06:54  Ca    9.3      17 Feb 2022 04:38  Phos  3.0     02-18  Mg     1.8     02-18    TPro  6.4  /  Alb  2.4<L>  /  TBili  0.4  /  DBili  x   /  AST  45<H>  /  ALT  30  /  AlkPhos  72  02-18  TPro  7.5  /  Alb  3.0<L>  /  TBili  0.4  /  DBili  x   /  AST  34  /  ALT  28  /  AlkPhos  90  02-17

## 2022-02-18 NOTE — PROGRESS NOTE ADULT - SUBJECTIVE AND OBJECTIVE BOX
Janneth Curran MD  Internal Medicine PGY1  [facility]: [id number]/ NS: [id number]    DATE OF SERVICE: 02-18-22 @ 07:43    Patient is a 81y old  Female who presents with a chief complaint of LE Swelling (17 Feb 2022 18:00)      SUBJECTIVE / OVERNIGHT EVENTS:    MEDICATIONS  (STANDING):  heparin   Injectable 5000 Unit(s) SubCutaneous every 8 hours  piperacillin/tazobactam IVPB.. 3.375 Gram(s) IV Intermittent every 8 hours    MEDICATIONS  (PRN):      Vital Signs Last 24 Hrs  T(C): 36.9 (18 Feb 2022 04:34), Max: 37.4 (17 Feb 2022 20:17)  T(F): 98.4 (18 Feb 2022 04:34), Max: 99.4 (17 Feb 2022 20:17)  HR: 97 (18 Feb 2022 04:34) (97 - 117)  BP: 130/63 (18 Feb 2022 04:34) (111/70 - 139/79)  BP(mean): --  RR: 20 (18 Feb 2022 04:34) (18 - 20)  SpO2: 97% (18 Feb 2022 04:34) (93% - 99%)  CAPILLARY BLOOD GLUCOSE        I&O's Summary    17 Feb 2022 07:01  -  18 Feb 2022 07:00  --------------------------------------------------------  IN: 100 mL / OUT: 0 mL / NET: 100 mL      GENERAL: lying on side; uncomfortable  PSYCH: disgruntled, mood congruent  NEURO: AAO3, no focal deficits  EYES: EOMI, PERRLA, conjunctiva and sclera clear  ENT: Moist mucous membranes  NECK: Supple, no JVD  HEART: Regular rate and rhythm, no murmurs, rubs, or gallops  LUNGS: Unlabored respirations.  Clear to auscultation bilaterally, no crackles, wheezing, or rhonchi  ABDOMEN: Soft, nontender, nondistended, +BS  EXTREMITIES: + LE edema Anasarca appearing, 2+ peripheral pulses bilaterally. No clubbing, cyanosis  SKIN: No rashes or lesions    LABS:                        6.7    19.71 )-----------( 565      ( 18 Feb 2022 06:55 )             22.0     02-18    133<L>  |  101  |  22  ----------------------------<  92  5.2   |  20<L>  |  1.33<H>    Ca    8.4      18 Feb 2022 06:54  Phos  3.0     02-18  Mg     1.8     02-18    TPro  6.4  /  Alb  2.4<L>  /  TBili  0.4  /  DBili  x   /  AST  45<H>  /  ALT  30  /  AlkPhos  72  02-18    PT/INR - ( 17 Feb 2022 11:13 )   PT: 16.3 sec;   INR: 1.38 ratio         PTT - ( 17 Feb 2022 11:13 )  PTT:21.9 sec          RADIOLOGY & ADDITIONAL TESTS:    Imaging Personally Reviewed:    Consultant(s) Notes Reviewed:      Care Discussed with Consultants/Other Providers:   Janneth Curran MD  Internal Medicine PGY1  [facility]: [id number]/ NS: [phone]    DATE OF SERVICE: 02-18-22 @ 07:43    Patient is a 81y old  Female who presents with a chief complaint of LE Swelling (17 Feb 2022 18:00)      SUBJECTIVE / OVERNIGHT EVENTS:  PT was intermittently tachycardic and provided supplemental 02 for subjective SOB, and tachypnea.   PT declined heparin drip and subcutaneous heparin.     MEDICATIONS  (STANDING):  heparin   Injectable 5000 Unit(s) SubCutaneous every 8 hours  piperacillin/tazobactam IVPB.. 3.375 Gram(s) IV Intermittent every 8 hours    MEDICATIONS  (PRN):      Vital Signs Last 24 Hrs  T(C): 36.9 (18 Feb 2022 04:34), Max: 37.4 (17 Feb 2022 20:17)  T(F): 98.4 (18 Feb 2022 04:34), Max: 99.4 (17 Feb 2022 20:17)  HR: 97 (18 Feb 2022 04:34) (97 - 117)  BP: 130/63 (18 Feb 2022 04:34) (111/70 - 139/79)  BP(mean): --  RR: 20 (18 Feb 2022 04:34) (18 - 20)  SpO2: 97% (18 Feb 2022 04:34) (93% - 99%)  CAPILLARY BLOOD GLUCOSE        I&O's Summary    17 Feb 2022 07:01  -  18 Feb 2022 07:00  --------------------------------------------------------  IN: 100 mL / OUT: 0 mL / NET: 100 mL      GENERAL: lying on side; uncomfortable  PSYCH: disgruntled, mood congruent  NEURO: AAO3, no focal deficits  EYES: EOMI, PERRLA, conjunctiva and sclera clear  ENT: Moist mucous membranes  NECK: Supple, no JVD  HEART: Regular rate and rhythm, no murmurs, rubs, or gallops  LUNGS: Unlabored respirations.  Clear to auscultation bilaterally, no crackles, wheezing, or rhonchi  ABDOMEN: Soft, nontender, nondistended, +BS  EXTREMITIES: + LE edema Anasarca appearing, 2+ peripheral pulses bilaterally. No clubbing, cyanosis  SKIN: No rashes or lesions    LABS:                        6.7    19.71 )-----------( 565      ( 18 Feb 2022 06:55 )             22.0     02-18    133<L>  |  101  |  22  ----------------------------<  92  5.2   |  20<L>  |  1.33<H>    Ca    8.4      18 Feb 2022 06:54  Phos  3.0     02-18  Mg     1.8     02-18    TPro  6.4  /  Alb  2.4<L>  /  TBili  0.4  /  DBili  x   /  AST  45<H>  /  ALT  30  /  AlkPhos  72  02-18    PT/INR - ( 17 Feb 2022 11:13 )   PT: 16.3 sec;   INR: 1.38 ratio         PTT - ( 17 Feb 2022 11:13 )  PTT:21.9 sec          RADIOLOGY & ADDITIONAL TESTS:    Imaging Personally Reviewed:    Consultant(s) Notes Reviewed:      Care Discussed with Consultants/Other Providers:   Janneth Curran MD  Internal Medicine PGY1  [facility]: [id number]/ NS: [phone]    DATE OF SERVICE: 02-18-22 @ 07:43    Patient is a 81y old  Female who presents with a chief complaint of LE Swelling (17 Feb 2022 18:00)      SUBJECTIVE / OVERNIGHT EVENTS:  PT was intermittently tachycardic and provided supplemental 02 for subjective SOB, and tachypnea.   PT declined heparin drip and subcutaneous heparin.   Pt denies chest pain, shortness of breath though appears visibly uncomfortable and touches her upper chest referencing that she does not feel well.     MEDICATIONS  (STANDING):  heparin   Injectable 5000 Unit(s) SubCutaneous every 8 hours  piperacillin/tazobactam IVPB.. 3.375 Gram(s) IV Intermittent every 8 hours    MEDICATIONS  (PRN):      Vital Signs Last 24 Hrs  T(C): 36.9 (18 Feb 2022 04:34), Max: 37.4 (17 Feb 2022 20:17)  T(F): 98.4 (18 Feb 2022 04:34), Max: 99.4 (17 Feb 2022 20:17)  HR: 97 (18 Feb 2022 04:34) (97 - 117)  BP: 130/63 (18 Feb 2022 04:34) (111/70 - 139/79)  BP(mean): --  RR: 20 (18 Feb 2022 04:34) (18 - 20)  SpO2: 97% (18 Feb 2022 04:34) (93% - 99%)  CAPILLARY BLOOD GLUCOSE        I&O's Summary    17 Feb 2022 07:01  -  18 Feb 2022 07:00  --------------------------------------------------------  IN: 100 mL / OUT: 0 mL / NET: 100 mL      GENERAL: lying on side; uncomfortable  PSYCH: disgruntled, mood congruent  NEURO: AAO3, no focal deficits  EYES: EOMI, PERRLA, conjunctiva and sclera clear  ENT: Moist mucous membranes  NECK: Supple, no JVD  HEART: Regular rate and rhythm, no murmurs, rubs, or gallops  LUNGS: labored respirations.  Clear to auscultation bilaterally, no crackles, wheezing, or rhonchi  ABDOMEN: Soft, nontender, nondistended, +BS  EXTREMITIES: + LE edema Anasarca appearing, 2+ peripheral pulses bilaterally. No clubbing, cyanosis  SKIN: No rashes or lesions    LABS:                        6.7    19.71 )-----------( 565      ( 18 Feb 2022 06:55 )             22.0     02-18    133<L>  |  101  |  22  ----------------------------<  92  5.2   |  20<L>  |  1.33<H>    Ca    8.4      18 Feb 2022 06:54  Phos  3.0     02-18  Mg     1.8     02-18    TPro  6.4  /  Alb  2.4<L>  /  TBili  0.4  /  DBili  x   /  AST  45<H>  /  ALT  30  /  AlkPhos  72  02-18    PT/INR - ( 17 Feb 2022 11:13 )   PT: 16.3 sec;   INR: 1.38 ratio         PTT - ( 17 Feb 2022 11:13 )  PTT:21.9 sec          RADIOLOGY & ADDITIONAL TESTS:    Imaging Personally Reviewed:    Consultant(s) Notes Reviewed:      Care Discussed with Consultants/Other Providers:   Janneth Curran MD  Internal Medicine PGY1  [facility]: [id number]/ NS: [phone]    DATE OF SERVICE: 02-18-22 @ 07:43    Patient is a 81y old  Female who presents with a chief complaint of LE Swelling (17 Feb 2022 18:00)      SUBJECTIVE / OVERNIGHT EVENTS:  PT was intermittently tachycardic and provided supplemental 02 for subjective SOB, and tachypnea.   PT declined heparin drip and subcutaneous heparin.   Pt denies chest pain, shortness of breath though appears visibly uncomfortable and touches her upper chest referencing that she does not feel well.   [facility] discussion/ Family meeting planned for this afternoon.     MEDICATIONS  (STANDING):  heparin   Injectable 5000 Unit(s) SubCutaneous every 8 hours  piperacillin/tazobactam IVPB.. 3.375 Gram(s) IV Intermittent every 8 hours    MEDICATIONS  (PRN):      Vital Signs Last 24 Hrs  T(C): 36.9 (18 Feb 2022 04:34), Max: 37.4 (17 Feb 2022 20:17)  T(F): 98.4 (18 Feb 2022 04:34), Max: 99.4 (17 Feb 2022 20:17)  HR: 97 (18 Feb 2022 04:34) (97 - 117)  BP: 130/63 (18 Feb 2022 04:34) (111/70 - 139/79)  BP(mean): --  RR: 20 (18 Feb 2022 04:34) (18 - 20)  SpO2: 97% (18 Feb 2022 04:34) (93% - 99%)  CAPILLARY BLOOD GLUCOSE        I&O's Summary    17 Feb 2022 07:01  -  18 Feb 2022 07:00  --------------------------------------------------------  IN: 100 mL / OUT: 0 mL / NET: 100 mL      GENERAL: lying on side; uncomfortable  PSYCH: disgruntled, mood congruent  NEURO: AAO3, no focal deficits  EYES: EOMI, PERRLA, conjunctiva and sclera clear  ENT: Moist mucous membranes  NECK: Supple, no JVD  HEART: Regular rate and rhythm, no murmurs, rubs, or gallops  LUNGS: labored respirations.  Clear to auscultation bilaterally, no crackles, wheezing, or rhonchi  ABDOMEN: Soft, nontender, nondistended, +BS  EXTREMITIES: + LE edema Anasarca appearing, 2+ peripheral pulses bilaterally. No clubbing, cyanosis  SKIN: No rashes or lesions    LABS:                        6.7    19.71 )-----------( 565      ( 18 Feb 2022 06:55 )             22.0     02-18    133<L>  |  101  |  22  ----------------------------<  92  5.2   |  20<L>  |  1.33<H>    Ca    8.4      18 Feb 2022 06:54  Phos  3.0     02-18  Mg     1.8     02-18    TPro  6.4  /  Alb  2.4<L>  /  TBili  0.4  /  DBili  x   /  AST  45<H>  /  ALT  30  /  AlkPhos  72  02-18    PT/INR - ( 17 Feb 2022 11:13 )   PT: 16.3 sec;   INR: 1.38 ratio         PTT - ( 17 Feb 2022 11:13 )  PTT:21.9 sec          RADIOLOGY & ADDITIONAL TESTS:    Imaging Personally Reviewed:    Consultant(s) Notes Reviewed:      Care Discussed with Consultants/Other Providers:

## 2022-02-18 NOTE — CHART NOTE - NSCHARTNOTEFT_GEN_A_CORE
Please call pulmonary back if patient and daughter interested in further workup and management of bilateral spiculated pulmonary opacities including possible treatment if malignancy is found.    Pulmonary to sign off.     Dr. Braulio Pérez, DO  Pulmonary and Critical Care Medicine Fellow   Available via Microsoft Teams - **Preferred**  Pulmonary [facility] #[id number] ([facility]) / [phone] ([facility])  Pager:  [phone]

## 2022-02-18 NOTE — PROGRESS NOTE ADULT - ASSESSMENT
80 y/o F [employer] hx of asthma, uterine prolapse req hysterectomy c/b rectovaginal fistula p/w lower extremity swelling and lethargy, found to have DVT, w/t CT  chest, abdomen, pelvis,  imaging consistent with lung malignancy.  82 y/o F [employer] hx of asthma, uterine prolapse req hysterectomy c/b rectovaginal fistula p/w lower extremity swelling and lethargy, found to have DVT, w/t CT  chest, abdomen, pelvis,  imaging consistent with lung malignancy. GOC discussion/ Family meeting planned for this afternoon.

## 2022-02-18 NOTE — DIETITIAN INITIAL EVALUATION ADULT. - ADD RECOMMEND
1) Continue current diet order: soy restricted, vegan, no dairy diet as ordered/ tolerated. 2) Recommend multivitamin if no contraindications to optimize in nutrient intake. 3) Recommend Ensure Enlive 2x/day to optimize nutrient intake. 4) Encourage adequate intake of meals and supplements to optimize PO intake. 5) Monitor PO intake/tolerance, weights, labs, hydration status, bowels, and skin integrity. 6) Malnutrition notification placed.

## 2022-02-18 NOTE — PROGRESS NOTE ADULT - PROBLEM SELECTOR PLAN 7
Anticoagulation: Start Heparin drip no bolus. If patient declines Heparin drip, start Heparin 5,00 subc Q8  Diet: Regular  Dispo: pending PT and Family meeting Complicated Rectovaginal fistula s/p Hysterectomy for Uterine prolapse 5 years ago  Pt with +ve vaginal fecal materal  Plan:   - c/w Zosyn 3.3.75 Q8  - f/u urinalysis and urine culture  - monitor for signs of uti, fever curve, trend wbc

## 2022-02-18 NOTE — DIETITIAN INITIAL EVALUATION ADULT. - ORAL INTAKE PTA/DIET HISTORY
Pt out of bed to chair. Pt reports poor appetite and PO intake PTA 2/2 to loss of taste. States to be consuming less than 3 meals a day with no dietary restrictions. Pt takes multivitamin, no use of protein- energy supplements. Confirms NFKA. Denies hx of chewing and swallowing difficulties. Pt reports 14 pound weight loss PTA in past 3 weeks, 9% weight loss (clinically significant).

## 2022-02-18 NOTE — DIETITIAN NUTRITION RISK NOTIFICATION - TREATMENT: THE FOLLOWING DIET HAS BEEN RECOMMENDED
Diet, Regular:   Soy Restricted  Vegan {Accepts Vegetable Products Only}  No Dairy (02-18-22 @ 14:25) [Active]

## 2022-02-18 NOTE — DIETITIAN INITIAL EVALUATION ADULT. - PROBLEM SELECTOR PLAN 1
wbc 20 with increased neutrophils, thrombocytosis. Tachycardia. Most likely 2/2 infection given hx malaise and antibiotics course i/s/o Rectovaginal fistula (possible genitourinary infection) versus new primary malignancy (CT with new RLL opacities)  ECG wnl  Plan:  - c/w Empiric Zosyn  - f/u blood Cx  - f/u Urine sample, though will likely be contaminated  - f/u fever curve, if febrile repeat cultures  - Encourage po intake

## 2022-02-18 NOTE — DIETITIAN INITIAL EVALUATION ADULT. - PROBLEM SELECTOR PROBLEM 2
Weekly Progress Note  Gustavo Saleh  1970  [id number]      D) Pt attended 2/3 groups this week with 1 unexcused absence. Patient attended 1 individual session on 6/7/19.   A) Staff facilitated groups and reviewed tx progress. Assessed for [facility]. R) No VAP needed at this time.     Any significant events, defines as events that impact patients relationship with others inside and outside of treatment: None reported this week.   Indicate any changes or monitoring of physical or mental health problems: Patient reported having should scope and reported significant pain.   Indicate involvement by any outside supports: Yes: Patient reports current probation.   IAPP reviewed and modified as needed: NA    Pt working on the following dimensions:  Dimension #1 - Withdrawal Potential - Risk 0. Patient reports last use of methamphetamine and alcohol on 2/19/19 at time of intake. Patient has had multiple reported relapse while in group. Patient reported using on 4/23/19, and was informed that he cannot attend group high.  Patient was requested to complete UA on 5/17/19, which was positive for amphetamines. Patient was requested to complete a UA on 5/29/19 which was negative for all tested substances.Patient denies recent withdrawal symptoms.  Specific goals from treatment plan addressed this week:  Patient did not report any substance use this week.  Patient was not requested to take a UA this week.   Effectiveness of strategies:  Strategies appear effective for now.   Dimension #2 - Biomedical - Risk 0. Patient reports stable health. Patient has [employer] insurance. Patient does not have primary care provider. Patient is able to seek cares as needed  Specific goals from treatment plan addressed this week:  Patient reported continued shoulder pain and that he will need to continue with physical therapy.   Effectiveness of strategies:  Strategies appear effective, staff to continue to monitor.   Dimension #3 -  Emotional/Behavioral/Cognitive - Risk 2. Patient has concerns about depression, ADHD, OCD, grief, and bi-polar. Patient does not have mental health care provider. Patient reports recently experiencing mental health symptoms. Patient denies suicidal ideation. Patient has suicide attempt in 2004.  Active interventions to stabilize mental health symptoms:  Patient attended MICD group.   Specific goals from treatment plan addressed this week:  Patient reported he is continue to practice acceptance in his relationships, instead of reacting to others.   Effectiveness of strategies:  Strategies appear effective.   Dimension #4 - Readiness for Change - Risk 0. Patient verbalizes a readiness and willingness for change.  Specific goals from treatment plan addressed this week:  Patient to attended 2/3 groups with 1 unexcused absence. Patient signed treatment agreement, and is aware that an additional unexcused absences or leaving group early without discussing with staff with result in immediate discharge.   Effectiveness of strategies:  Strategies appear effective.   Dimension #5 - Relapse Potential - Risk 3. Patient lacks healthy, positive coping skills. Patient lacks skills to prevent relapse. Patient may not recognize impact substance use has on mental health. Patient has had several treatment episodes. Patient has had significant periods of sobriety in the past, mostly while incarcerated. Patient has had five reported relapse while in treatment.   Specific goals from treatment plan addressed this week:  Patient reported no use this week, and reported minimal urges. Patient reports he's been coping by keeping busy.    Effectiveness of strategies:  Strategies appear effective.   Dimension #6 - Recovery Environment - Risk 2. Patient is employed. Patient has stable housing. Patient has poor boundaries with using individuals. Patient has support system. Patient is currently on probation with [facility]. Patient has several  past legals.  Specific goals from treatment plan addressed this week:  Patient reported he missed an appointment with his PO on 6/5/19 but reported plan to meet with her on 6/5/19. Patient reported continuing to attend recovery meeting and talk to his sponsor.    Effectiveness of strategies:  Strategies appear effective.    T) Treatment plan updated: no  Patient notified and in agreement: patient signed plan on 6/5/19.   Patient educated on Emotions. Patient has completed 42 of 90 program hours at this time. Projected discharge date is 6/28/19 Current discharge plan is TBD.     Stephy Sanz, TATIANNA, [facility]  6/7/2019, 2:11 PM       Weekly Educational Topics Date   1. Dual Diagnoses, week 1 5/13, 5/17   2. Dual Diagnoses, week 2 5/24   3. Stress Management 5/29   4. Feelings/Emotions 6/3, 6/7   5. Thinking    6. Change    7. Recovery Support 3/27, 3/29   8. Relationships/Communication 4/1, 4/3   9. Addiction 101 4/9, 4/10   10. Relapse Prevention 4/17   11. Grief and Loss 4/22, 4/23, 4/26   12. Strengths 4/30, 5/3   13. Wellness 5/8   Mindfulness  3/29, 4/26, 5/3, 6/7            DVT, lower extremity

## 2022-02-18 NOTE — DIETITIAN INITIAL EVALUATION ADULT. - PERSON TAUGHT/METHOD
Discussed need for nutrition supplement and increased protein-energy needs with pt. Pt verbalized understanding./patient instructed

## 2022-02-18 NOTE — PROGRESS NOTE ADULT - PROBLEM SELECTOR PLAN 4
wbc 20 with increased neutrophils, thrombocytosis. Tachycardia. Most likely 2/2 infection given hx malaise and antibiotics course i/s/o Rectovaginal fistula (possible genitourinary infection) versus new primary malignancy (CT with new RLL opacities)  ECG wnl  Plan:  - c/w Empiric Zosyn  - f/u blood Cx  - f/u Urine sample, though will likely be contaminated  - f/u fever curve, if febrile repeat cultures  - Encourage po intake CT Bilateral airspace spiculated opacities, the largest in the right lower lobe measuring up to 6.0 cm.  Concerning for possible malignancy, ground glass opacities w/t bilateral small pleural effusions   Patient w/t + 10-15lb weight loss, general fatigue/ malaise Ruling out malignancy  Plan:   - Pulm consult  - continuos pulse oximetry  - effusions too small for thoracentesis and patient is asymptomatic

## 2022-02-18 NOTE — PROVIDER CONTACT NOTE (OTHER) - SITUATION
Pt on tyree monitor is tachycardic
Pt's daughter came to unit at 0100 r/t pt & daughter's refusal of heparin and anticoags.

## 2022-02-18 NOTE — PROGRESS NOTE ADULT - ASSESSMENT
Assessment:  1. RLL mass suspicious for malignancy  2. B/l below knee DVT      Plan:  1. DVT likely provoked by malignancy  2. given patient's refusal for therapeutic heparin and anemia, would hold off on heparin at this time  3. Ideally check CT PE protocol, however pt has already declined  4. Check TTE  5. 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 Mgmt of lung mass as per primary team/pulm    Thank you      Vascular Cardiology Service    Please call with any questions:   DIRECT SERVICE NUMBER:  [phone]  Office [phone]  email:  [email or web address]   Assessment:  1. RLL mass suspicious for malignancy  2. B/l below knee DVT      Plan:  1. DVT likely provoked by malignancy  2. given patient's refusal for therapeutic heparin and blood products for anemia (Yazidism), would hold off on heparin at this time  3. Ideally check CT PE protocol, however pt has already declined  4. Check TTE  5. Mgmt of lung mass as per primary team/pulm    Thank you      Vascular Cardiology Service    Please call with any questions:   DIRECT SERVICE NUMBER:  [phone]  Office [phone]  email:  [email or web address]

## 2022-02-18 NOTE — PROGRESS NOTE ADULT - PROBLEM SELECTOR PLAN 6
Complicated Rectovaginal fistula s/p Hysterectomy for Uterine prolapse 5 years ago  Pt with +ve vaginal fecal materal  Plan:   - c/w Zosyn 3.3.75 Q8  - f/u urinalysis and urine culture  - monitor for signs of uti, fever curve, trend wbc TSH 9.  With symptoms arguably consistent with Hypothyroidism: tiredness and weight loss   Plan:   - f/u repeat TSH in the am  - f/u free T4  - continue to monitor

## 2022-02-18 NOTE — DIETITIAN INITIAL EVALUATION ADULT. - REASON INDICATOR FOR ASSESSMENT
Nutrition consult warranted for weight loss PTA.  Information obtained from: medical record, patient

## 2022-02-18 NOTE — PROGRESS NOTE ADULT - PROBLEM SELECTOR PLAN 5
TSH 9.  With symptoms arguably consistent with Hypothyroidism: tiredeness and weight loss   Plan:   - f/u repeat TSH in the am  - f/u free T4  - continue to monitor TSH 9.  With symptoms arguably consistent with Hypothyroidism: tiredness and weight loss   Plan:   - f/u repeat TSH in the am  - f/u free T4  - continue to monitor wbc 20 with increased neutrophils, thrombocytosis. Tachycardia. Most likely 2/2 infection given hx malaise and antibiotics course i/s/o Rectovaginal fistula (possible genitourinary infection) versus new primary malignancy (CT with new RLL opacities)  ECG wnl  Plan:  - c/w Empiric Zosyn  - f/u blood Cx  - f/u Urine sample, though will likely be contaminated  - f/u fever curve, if febrile repeat cultures  - Encourage po intake

## 2022-02-18 NOTE — DIETITIAN INITIAL EVALUATION ADULT. - PROBLEM SELECTOR PLAN 3
CT Bilateral airspace spiculated opacities, the largest in the right lower lobe measuring up to 6.0 cm.  Concerning for possible malignancy, ground glass opacities w/t bilateral small pleural effusions   Patient w/t + 10-15lb weight loss, general fatigue/ malaise Ruling out malignancy  Plan:   - Pulm consult  - continuos pulse oximetry  - effusions too small for thoracentesis and patient is asymptomatic

## 2022-02-18 NOTE — PROGRESS NOTE ADULT - PROBLEM SELECTOR PLAN 3
CT Bilateral airspace spiculated opacities, the largest in the right lower lobe measuring up to 6.0 cm.  Concerning for possible malignancy, ground glass opacities w/t bilateral small pleural effusions   Patient w/t + 10-15lb weight loss, general fatigue/ malaise Ruling out malignancy  Plan:   - Pulm consult  - continuos pulse oximetry  - effusions too small for thoracentesis and patient is asymptomatic LE swelling 2/2 Bilateral DVT's in bilateral Soleus and Right peroneal vein on [facility] Doppler  Unable to R/o PE without CT chest IV contrast. the ProBNP is elevated   HAS-BLED 4, Anemia, and [place]; however benefits outweigh risks to start Heparin drip, With NO bolus per Vascular Cardiology   *Patient denies Heparin. Goals of Care Discussion planned for today.   Plan:  - Per Vasc Cards, start Heparin Drip, weight based with ptt normogram  - Ptt goal 50-70 as per Vascular Cardiology  - CT chest w/ IV contrast if patient amenable  - Echo to r/o Right heart strain   - CBC, coags daily  - LFT's

## 2022-02-18 NOTE — DIETITIAN INITIAL EVALUATION ADULT. - OTHER INFO
Pt reports continued poor appetite and PO intake since admission. Observed lunch tray at table with 0% consumed. Of note, pt is ordered for a vegan, no dairy, soy restricted diet. Pt denies following any of these dietary restrictions at home, states she prefers to follow current dietary restrictions during hospital stay 2/2 current illness. Pt is amenable to trial Ensure Enlive 2x/day to optimize nutrient intake.     GI: Pt denies nausea/ vomiting and diarrhea/ constipation. Reports last BM was today 2/18. Pt not currently ordered for bowel regimen at this time.    Wt Hx: Pt reports UBW is 72. 7 kg. Reports significant weight loss of 14 pounds in the past 3 weeks. Dosing weight: 63.5 kg on 2-17. Per [facility] HIE: 68 kg on 1-, 68.9 kg on 2-9-2020, 69.9 kg on 7-.  Previous weights relatively consistent with dosing weight. No additional weights in nursing flow sheets. Will continue to monitor as able.     Discussed need for nutrition supplement and increased protein-energy needs with pt. Pt verbalized understanding. Emphasized the importance of adequate kcal and protein intake, provided recommendations to optimize nutritional intake for decreased appetite, recommended small frequent meals by consuming nutrient-dense snacks between meals, to start with protein, and sips of supplement throughout the day. Pt made aware RD remains available. Pt reports continued poor appetite and PO intake since admission. Observed lunch tray at table with 0% consumed. Of note, pt is ordered for a vegan, no dairy, soy restricted diet. Pt denies following any of these dietary restrictions at home, states she prefers to follow current dietary restrictions during hospital stay 2/2 current illness. Pt is amenable to trial Ensure Enlive 2x/day to optimize nutrient intake.   Of note, Na <L> 133, pt ordered for Iron Sucrolose 200 mg in NaCl 0.9%.     GI: Pt denies nausea/ vomiting and diarrhea/ constipation. Reports last BM was today 2/18. Pt not currently ordered for bowel regimen at this time.    Wt Hx: Pt reports UBW is 72. 7 kg. Reports significant weight loss of 14 pounds in the past 3 weeks. Dosing weight: 63.5 kg on 2-17. Per [facility] HIE: 68 kg on 1-, 68.9 kg on 2-9-2020, 69.9 kg on 7-.  Previous weights relatively consistent with dosing weight. No additional weights in nursing flow sheets. Will continue to monitor as able.     Discussed need for nutrition supplement and increased protein-energy needs with pt. Pt verbalized understanding. Emphasized the importance of adequate kcal and protein intake, provided recommendations to optimize nutritional intake for decreased appetite, recommended small frequent meals by consuming nutrient-dense snacks between meals, to start with protein, and sips of supplement throughout the day. Pt made aware RD remains available. Pt reports continued poor appetite and PO intake since admission. Observed lunch tray at table with 0% consumed. Of note, pt is ordered for a vegan, no dairy, soy restricted diet. Pt denies following any of these dietary restrictions at home, states she prefers to follow current dietary restrictions during hospital stay 2/2 current illness. Pt is amenable to trial Ensure Enlive 2x/day to optimize nutrient intake.   Of note, Na <L> 133, pt ordered for Iron Sucrolose 200 mg in NaCl 0.9%.     GI: Pt denies nausea/ vomiting and diarrhea/ constipation. Reports last BM was today 2/18. Pt not currently ordered for bowel regimen at this time.    Wt Hx: Pt reports UBW is 72. 7 kg. Reports significant weight loss of 6.4 kg in the past 3 weeks (9% weight loss). Dosing weight: 63.5 kg on 2-17. Per [facility] HIE: 68 kg on 1-, 68.9 kg on 2-9-2020, 69.9 kg on 7-.  Previous weights relatively consistent with dosing weight. No additional weights in nursing flow sheets. Will continue to monitor as able.     Discussed need for nutrition supplement and increased protein-energy needs with pt. Pt verbalized understanding. Emphasized the importance of adequate kcal and protein intake, provided recommendations to optimize nutritional intake for decreased appetite, recommended small frequent meals by consuming nutrient-dense snacks between meals, to start with protein, and sips of supplement throughout the day. Pt made aware RD remains available.

## 2022-02-18 NOTE — PROGRESS NOTE ADULT - PROBLEM SELECTOR PLAN 1
Hgb 6.7-->8 (Overnight) and feeling SOB w/t fluctuating saturations , possibly i/s/o possible Malignancy   decreased from 14 one year ago.   Hgb drop concerning for Fe deficiency versus Anemia of Chronic disease  Plan:   - Patient is [employer]'s Witness, does not accept blood products, consider asking if patient accepts platelets  - supplemental 02 as needed  - Fu ferritin, TIBC, Iron serum  - Fu LDH, haptoglobin  - CBC QD  - MAintain active type and screen Hgb 6.7-->8 (Overnight) and feeling SOB w/t fluctuating saturations , possibly i/s/o possible Malignancy   decreased from 14 one year ago.  Ferritin 507   Hgb drop concerning for Fe deficiency versus Anemia of Chronic disease  Plan:   - IV Fe  - Patient is [employer]'s Witness, does not accept blood products, consider asking if patient accepts platelets  - supplemental 02 as needed  - Fu ferritin, TIBC, Iron serum  - Fu LDH, haptoglobin  - CBC QD  - MAintain active type and screen Tachy to 117 OVernight. with known bilateral dvt's and LE swelling, and anemia this could be symptoms of anemia versus PE  PT declines CT chest with contrast to R/o PE  PT declines Echo yesterday  Echo with RBBB  Plan:   - continue to monitor  - Vasc Cards reccs  - GOC discussion this afternoon

## 2022-02-19 NOTE — PROGRESS NOTE ADULT - PROBLEM SELECTOR PLAN 2
Hgb 6.7-->8 (Overnight) and feeling SOB w/t fluctuating saturations , possibly i/s/o possible Malignancy   decreased from 14 one year ago.  Ferritin 507   Hgb drop concerning for Fe deficiency versus Anemia of Chronic disease  Plan:   - IV Fe  - Patient is [employer]'s Witness, does not accept blood products, consider asking if patient accepts platelets  - supplemental 02 as needed  - Fu ferritin, TIBC, Iron serum  - Fu LDH, haptoglobin  - CBC QD  - MAintain active type and screen Hgb 6.7-->8 (Overnight) and feeling SOB w/t fluctuating saturations , possibly i/s/o possible Malignancy   decreased from 14 one year ago.  Ferritin 507   Hgb drop concerning for Fe deficiency versus Anemia of Chronic disease  Plan:   - IV Fe  - Patient is [employer]'s Witness, does not accept blood products, consider asking if patient accepts platelets  - supplemental 02 as needed  --- Ferritin elevated    --- TIBC, Iron serum decreased   --- Haptoglobin elevated   ---- Fu LDH  - CBC QD  - Maintain active type and screen

## 2022-02-19 NOTE — PROGRESS NOTE ADULT - ASSESSMENT
82 y/o F Latter day hx of asthma, uterine prolapse req hysterectomy c/b rectovaginal fistula p/w lower extremity swelling and lethargy, found to have DVT, w/t CT  chest, abdomen, pelvis,  imaging consistent with lung malignancy. GOC discussion/ Family meeting planned for this afternoon.  80 y/o F Nondenominational hx of asthma, uterine prolapse req hysterectomy c/b rectovaginal fistula p/w lower extremity swelling and lethargy, found to have DVT, w/t CT  chest, abdomen, pelvis,  imaging consistent with lung malignancy.

## 2022-02-19 NOTE — PROGRESS NOTE ADULT - PROBLEM SELECTOR PLAN 3
LE swelling 2/2 Bilateral DVT's in bilateral Soleus and Right peroneal vein on [facility] Doppler  Unable to R/o PE without CT chest IV contrast. the ProBNP is elevated   HAS-BLED 4, Anemia, and [employer]; however benefits outweigh risks to start Heparin drip, With NO bolus per Vascular Cardiology   *Patient denies Heparin. Goals of Care Discussion planned for today.   Plan:  - Per Vasc Cards, start Heparin Drip, weight based with ptt normogram  - Ptt goal 50-70 as per Vascular Cardiology  - CT chest w/ IV contrast if patient amenable  - Echo to r/o Right heart strain   - CBC, coags daily  - LFT's LE swelling 2/2 Bilateral DVT's in bilateral Soleus and Right peroneal vein on [facility] Doppler  Unable to R/o PE without CT chest IV contrast. the ProBNP is elevated   HAS-BLED 4, Anemia, and Religious; however benefits outweigh risks to start Heparin drip, With NO bolus per Vascular Cardiology   Plan:  - Per Vasc Cards, start Heparin Drip, weight based with ptt normogram, patient denies heparin gtt, acquiesces to Sc heparin    - Ptt goal 50-70 as per Vascular Cardiology  - CT chest w/ IV contrast if patient amenable  - Echo to r/o Right heart strain   - CBC, coags daily  - LFT's

## 2022-02-19 NOTE — PROGRESS NOTE ADULT - PROBLEM SELECTOR PLAN 5
wbc 20 with increased neutrophils, thrombocytosis. Tachycardia. Most likely 2/2 infection given hx malaise and antibiotics course i/s/o Rectovaginal fistula (possible genitourinary infection) versus new primary malignancy (CT with new RLL opacities)  ECG wnl  Plan:  - c/w Empiric Zosyn  - f/u blood Cx  - f/u Urine sample, though will likely be contaminated  - f/u fever curve, if febrile repeat cultures  - Encourage po intake wbc 20 with increased neutrophils, thrombocytosis. Tachycardia. Most likely 2/2 infection given hx malaise and antibiotics course i/s/o Rectovaginal fistula (possible genitourinary infection) versus new primary malignancy (CT with new RLL opacities)  ECG wnl  Plan:  - c/w Empiric Zosyn  --- f/u blood Cx - currently NGTD   - f/u Urine sample, though will likely be contaminated  - f/u fever curve, if febrile repeat cultures  - Encourage po intake

## 2022-02-19 NOTE — PROGRESS NOTE ADULT - PROBLEM SELECTOR PLAN 6
TSH 9.  With symptoms arguably consistent with Hypothyroidism: tiredness and weight loss   Plan:   - f/u repeat TSH in the am  - f/u free T4  - continue to monitor

## 2022-02-19 NOTE — PROGRESS NOTE ADULT - PROBLEM SELECTOR PLAN 4
CT Bilateral airspace spiculated opacities, the largest in the right lower lobe measuring up to 6.0 cm.  Concerning for possible malignancy, ground glass opacities w/t bilateral small pleural effusions   Patient w/t + 10-15lb weight loss, general fatigue/ malaise Ruling out malignancy  Plan:   - Pulm consult  - continuos pulse oximetry  - effusions too small for thoracentesis and patient is asymptomatic CT Bilateral airspace spiculated opacities, the largest in the right lower lobe measuring up to 6.0 cm.  Concerning for possible malignancy, ground glass opacities w/t bilateral small pleural effusions   Patient w/t + 10-15lb weight loss, general fatigue/ malaise Ruling out malignancy  Plan:   --- Pulm consult, was not able to see patient, will return when patient is more amenable   - continuous pulse oximetry  - effusions too small for thoracentesis and patient is asymptomatic

## 2022-02-19 NOTE — PROGRESS NOTE ADULT - PROBLEM SELECTOR PLAN 1
Tachy to 117 OVernight. with known bilateral dvt's and LE swelling, and anemia this could be symptoms of anemia versus PE  PT declines CT chest with contrast to R/o PE  PT declines Echo yesterday  Echo with RBBB  Plan:   - continue to monitor  - Vasc Cards reccs  - GOC discussion this afternoon Tachy to 117 OVernight. with known bilateral dvt's and LE swelling, and anemia this could be symptoms of anemia versus PE  PT declines CT chest with contrast to R/o PE  PT declines Echo yesterday  Echo with RBBB  Plan:   - continue to monitor  - ordered for repeat echo   - Vasc Cards recs  - Per GOC discussion 2/18, family rejected CTA despite assurances of pretreatment

## 2022-02-19 NOTE — PROGRESS NOTE ADULT - SUBJECTIVE AND OBJECTIVE BOX
PROGRESS NOTE:     CONTACT INFO:  Shahzad Garcia MD | PGY-1  Pager: [phone] [place], [id number] [place]  Also on TEAMS  After 7pm page night float  On weekends after 1pm check resident assignment tab to see who is covering      Patient is a 81y old  Female who presents with a chief complaint of LE Swelling (18 Feb 2022 16:18)      SUBJECTIVE / OVERNIGHT EVENTS:    - No acute events overnight.   - No fevers/chills.  - Patient seen and evaluated at bedside.  - Denies SOB at rest, chest pain, palpitations, abdominal pain, nausea/vomiting    ADDITIONAL REVIEW OF SYSTEMS:    MEDICATIONS  (STANDING):  heparin   Injectable 5000 Unit(s) SubCutaneous every 8 hours  iron sucrose IVPB 200 milliGRAM(s) IV Intermittent every 24 hours  piperacillin/tazobactam IVPB.. 3.375 Gram(s) IV Intermittent every 8 hours    MEDICATIONS  (PRN):      CAPILLARY BLOOD GLUCOSE        I&O's Summary    17 Feb 2022 07:01  -  18 Feb 2022 07:00  --------------------------------------------------------  IN: 100 mL / OUT: 0 mL / NET: 100 mL        PHYSICAL EXAM:  Vital Signs Last 24 Hrs  T(C): 37.3 (18 Feb 2022 20:12), Max: 37.3 (18 Feb 2022 20:12)  T(F): 99.2 (18 Feb 2022 20:12), Max: 99.2 (18 Feb 2022 20:12)  HR: 113 (18 Feb 2022 20:12) (77 - 113)  BP: 120/59 (18 Feb 2022 20:12) (120/59 - 130/63)  BP(mean): --  RR: 20 (18 Feb 2022 20:12) (20 - 20)  SpO2: 100% (18 Feb 2022 20:12) (97% - 100%)    CONSTITUTIONAL: NAD, well-developed  RESPIRATORY: Normal respiratory effort; lungs are clear to auscultation bilaterally  CARDIOVASCULAR: Regular rate and rhythm, normal S1 and S2, no murmur/rub/gallop; No lower extremity edema; Peripheral pulses are 2+ bilaterally  ABDOMEN: Nontender to palpation, normoactive bowel sounds, no rebound/guarding; No hepatosplenomegaly  MUSCULOSKELETAL: no clubbing or cyanosis of digits; no joint swelling or tenderness to palpation  PSYCH: A+O to person, place, and time; affect appropriate    LABS:                        6.7    19.71 )-----------( 565      ( 18 Feb 2022 06:55 )             22.0     02-18    133<L>  |  101  |  22  ----------------------------<  92  5.2   |  20<L>  |  1.33<H>    Ca    8.4      18 Feb 2022 06:54  Phos  3.0     02-18  Mg     1.8     02-18    TPro  6.4  /  Alb  2.4<L>  /  TBili  0.4  /  DBili  x   /  AST  45<H>  /  ALT  30  /  AlkPhos  72  02-18    PT/INR - ( 17 Feb 2022 11:13 )   PT: 16.3 sec;   INR: 1.38 ratio         PTT - ( 17 Feb 2022 11:13 )  PTT:21.9 sec          Culture - Blood (collected 17 Feb 2022 13:12)  Source: .Blood Blood-Peripheral  Preliminary Report (18 Feb 2022 14:01):    No growth to date.    Culture - Blood (collected 17 Feb 2022 13:12)  Source: .Blood Blood  Preliminary Report (18 Feb 2022 14:01):    No growth to date.        RADIOLOGY & ADDITIONAL TESTS:  Results Reviewed:   Imaging Personally Reviewed:  Electrocardiogram Personally Reviewed:    COORDINATION OF CARE:  Care Discussed with Consultants/Other Providers [Y/N]: Y  Prior or Outpatient Records Reviewed [Y/N]: Y   PROGRESS NOTE:     CONTACT INFO:  Shahzad Garcia MD | PGY-1  Pager: [phone] [place], [id number] [place]  Also on TEAMS  After 7pm page night float  On weekends after 1pm check resident assignment tab to see who is covering      Patient is a 81y old  Female who presents with a chief complaint of LE Swelling (18 Feb 2022 16:18)      SUBJECTIVE / OVERNIGHT EVENTS:    - Says that her feet feel stiff  - Endorses wanting to go home   - On 2.5L NC   - No acute events overnight.   - Patient says that she feels weak   - No fevers/chills.  - Patient seen and evaluated at bedside.  - Denies chest pain, palpitations, abdominal pain, nausea/vomiting    ADDITIONAL REVIEW OF SYSTEMS:    MEDICATIONS  (STANDING):  heparin   Injectable 5000 Unit(s) SubCutaneous every 8 hours  iron sucrose IVPB 200 milliGRAM(s) IV Intermittent every 24 hours  piperacillin/tazobactam IVPB.. 3.375 Gram(s) IV Intermittent every 8 hours    MEDICATIONS  (PRN):      CAPILLARY BLOOD GLUCOSE        I&O's Summary    17 Feb 2022 07:01  -  18 Feb 2022 07:00  --------------------------------------------------------  IN: 100 mL / OUT: 0 mL / NET: 100 mL        PHYSICAL EXAM:  Vital Signs Last 24 Hrs  T(C): 37.3 (18 Feb 2022 20:12), Max: 37.3 (18 Feb 2022 20:12)  T(F): 99.2 (18 Feb 2022 20:12), Max: 99.2 (18 Feb 2022 20:12)  HR: 113 (18 Feb 2022 20:12) (77 - 113)  BP: 120/59 (18 Feb 2022 20:12) (120/59 - 130/63)  BP(mean): --  RR: 20 (18 Feb 2022 20:12) (20 - 20)  SpO2: 100% (18 Feb 2022 20:12) (97% - 100%)      CONSTITUTIONAL: NAD, age appropriate   RESPIRATORY: On 2.5L NC Normal respiratory effort; lungs are clear to auscultation bilaterally  CARDIOVASCULAR: Regular rate and rhythm, normal S1 and S2, no murmur/rub/gallop;  ABDOMEN: Nontender to palpation, normoactive bowel sounds, no rebound/guarding; No hepatosplenomegaly  MUSCULOSKELETAL: no clubbing or cyanosis of digits; no joint swelling or tenderness to palpation  EXTREMITIES: + LE edema Anasarca appearing, 2+ peripheral pulses bilaterally. No clubbing, cyanosis  PSYCH: A+O to person, place, and time; Disgruntled, mood congruent     LABS:                        6.7    19.71 )-----------( 565      ( 18 Feb 2022 06:55 )             22.0     02-18    133<L>  |  101  |  22  ----------------------------<  92  5.2   |  20<L>  |  1.33<H>    Ca    8.4      18 Feb 2022 06:54  Phos  3.0     02-18  Mg     1.8     02-18    TPro  6.4  /  Alb  2.4<L>  /  TBili  0.4  /  DBili  x   /  AST  45<H>  /  ALT  30  /  AlkPhos  72  02-18    PT/INR - ( 17 Feb 2022 11:13 )   PT: 16.3 sec;   INR: 1.38 ratio         PTT - ( 17 Feb 2022 11:13 )  PTT:21.9 sec          Culture - Blood (collected 17 Feb 2022 13:12)  Source: .Blood Blood-Peripheral  Preliminary Report (18 Feb 2022 14:01):    No growth to date.    Culture - Blood (collected 17 Feb 2022 13:12)  Source: .Blood Blood  Preliminary Report (18 Feb 2022 14:01):    No growth to date.        RADIOLOGY & ADDITIONAL TESTS:  Results Reviewed:   Imaging Personally Reviewed:  Electrocardiogram Personally Reviewed:    COORDINATION OF CARE:  Care Discussed with Consultants/Other Providers [Y/N]: Y  Prior or Outpatient Records Reviewed [Y/N]: Y

## 2022-02-20 NOTE — PROGRESS NOTE ADULT - PROBLEM SELECTOR PLAN 3
LE swelling 2/2 Bilateral DVT's in bilateral Soleus and Right peroneal vein on [facility] Doppler  Unable to R/o PE without CT chest IV contrast. the ProBNP is elevated   HAS-BLED 4, Anemia, and [employer]; however benefits outweigh risks to start Heparin drip, With NO bolus per Vascular Cardiology   Plan:  - Per Vasc Cards, start Heparin Drip, weight based with ptt normogram, patient denies heparin gtt, acquiesces to Sc heparin    - Ptt goal 50-70 as per Vascular Cardiology  - CT chest w/ IV contrast if patient amenable  - Echo to r/o Right heart strain   - CBC, coags daily  - LFT's LE swelling 2/2 Bilateral DVT's in bilateral Soleus and Right peroneal vein on [facility] Doppler  Unable to R/o PE without CT chest IV contrast. the ProBNP is elevated   HAS-BLED 4, Anemia, and [place]; however benefits outweigh risks to start Heparin drip, With NO bolus per Vascular Cardiology   Plan:  - Per Vasc Cards, start Heparin Drip, weight based with ptt normogram, patient declines heparin gtt, acquiesces to Sc heparin    - Ptt goal 50-70 as per Vascular Cardiology  - CT chest w/ IV contrast if patient amenable  - Echo to r/o Right heart strain   - CBC, coags daily  - LFT's

## 2022-02-20 NOTE — DISCHARGE NOTE NURSING/CASE MANAGEMENT/SOCIAL WORK - PATIENT PORTAL LINK FT
You can access the FollowMyHealth Patient Portal offered by [facility] by registering at the following website: [email or web address]. By joining [facility]’s FollowMyHealth portal, you will also be able to view your health information using other applications (apps) compatible with our system.

## 2022-02-20 NOTE — PROGRESS NOTE ADULT - PROBLEM SELECTOR PLAN 1
Tachy to 117 OVernight. with known bilateral dvt's and LE swelling, and anemia this could be symptoms of anemia versus PE  PT declines CT chest with contrast to R/o PE  PT declines Echo yesterday  Echo with RBBB  Plan:   - continue to monitor  - ordered for repeat echo   - Vasc Cards recs  - Per GOC discussion 2/18, family rejected CTA despite assurances of pretreatment Improving tachycardia, with known bilateral dvt's and LE swelling, and anemia this could be symptoms of anemia versus PE  PT declined CT chest with contrast to r/o PE  PT declined Echo  EKG with RBBB  Plan:   - continue to monitor  - ordered for repeat echo   - Vasc Cards recs  - Per GOC discussion 2/18, family rejected CTA despite assurances of pretreatment

## 2022-02-20 NOTE — PROGRESS NOTE ADULT - PROBLEM SELECTOR PLAN 4
CT Bilateral airspace spiculated opacities, the largest in the right lower lobe measuring up to 6.0 cm.  Concerning for possible malignancy, ground glass opacities w/t bilateral small pleural effusions   Patient w/t + 10-15lb weight loss, general fatigue/ malaise Ruling out malignancy  Plan:   --- Pulm consult, was not able to see patient, will return when patient is more amenable   - continuous pulse oximetry  - effusions too small for thoracentesis and patient is asymptomatic

## 2022-02-20 NOTE — DISCHARGE NOTE NURSING/CASE MANAGEMENT/SOCIAL WORK - NSDCPEFALRISK_GEN_ALL_CORE
For information on Fall & Injury Prevention, visit: [email or web address] OR  [email or web address] OR  https://www.cdc.gov/steadi/patient.html

## 2022-02-20 NOTE — PROGRESS NOTE ADULT - PROBLEM SELECTOR PLAN 2
Hgb 6.7-->8 (Overnight) and feeling SOB w/t fluctuating saturations , possibly i/s/o possible Malignancy   decreased from 14 one year ago.  Ferritin 507   Hgb drop concerning for Fe deficiency versus Anemia of Chronic disease  Plan:   - IV Fe  - Patient is Jehova's Witness, does not accept blood products, consider asking if patient accepts platelets  - supplemental 02 as needed  --- Ferritin elevated    --- TIBC, Iron serum decreased   --- Haptoglobin elevated   ---- Fu LDH  - CBC QD  - Maintain active type and screen Admission Hgb 6.7, and feeling SOB w/t fluctuating saturations , possibly i/s/o possible Malignancy   decreased from 14 one year ago.  Ferritin 507   Hgb drop concerning for Fe deficiency versus Anemia of Chronic disease  Plan:   - IV Fe  - Patient is Jehova's Witness, does not accept blood products, consider asking if patient accepts platelets  - supplemental 02 as needed  --- Ferritin elevated    --- TIBC, Iron serum decreased   --- Haptoglobin elevated   --- LDH WNL  - CBC QD  - Maintain active type and screen Admission Hgb 6.7, and feeling SOB w/t fluctuating saturations , possibly i/s/o possible Malignancy   decreased from 14 one year ago.  Ferritin 507   Hgb drop concerning for Fe deficiency versus Anemia of Chronic disease  Plan:   - c/w IV iron  - Patient is Jehova's Witness, does not accept blood products, consider asking if patient accepts platelets  - supplemental 02 as needed  --- Ferritin elevated    --- TIBC, Iron serum decreased   --- Haptoglobin elevated   --- LDH WNL  - CBC QD  - Maintain active type and screen

## 2022-02-20 NOTE — DISCHARGE NOTE NURSING/CASE MANAGEMENT/SOCIAL WORK - NSDCFUADDAPPT_GEN_ALL_CORE_FT
You should follow up with your PCP for your appointment next week.     We also provided the information for the [facility] and the Gynecology clinic for you to schedule follow up appointments.

## 2022-02-20 NOTE — PROGRESS NOTE ADULT - NUTRITIONAL ASSESSMENT
This patient has been assessed with a concern for Malnutrition and has been determined to have a diagnosis/diagnoses of Severe protein-calorie malnutrition.    This patient is being managed with:   Diet Regular-  Soy Restricted  Vegan {Accepts Vegetable Products Only}  No Dairy  Supplement Feeding Modality:  Oral  Ensure Enlive Cans or Servings Per Day:  2       Frequency:  Daily  Entered: Feb 18 2022  5:35PM    Diet Regular-  Soy Restricted  Vegan {Accepts Vegetable Products Only}  No Dairy  Entered: Feb 18 2022  2:25PM    The following pending diet order is being considered for treatment of Severe protein-calorie malnutrition:null
This patient has been assessed with a concern for Malnutrition and has been determined to have a diagnosis/diagnoses of Severe protein-calorie malnutrition.    This patient is being managed with:   Diet Regular-  Soy Restricted  Vegan {Accepts Vegetable Products Only}  No Dairy  Supplement Feeding Modality:  Oral  Ensure Enlive Cans or Servings Per Day:  2       Frequency:  Daily  Entered: Feb 18 2022  5:35PM    Diet Regular-  Soy Restricted  Vegan {Accepts Vegetable Products Only}  No Dairy  Entered: Feb 18 2022  2:25PM    The following pending diet order is being considered for treatment of Severe protein-calorie malnutrition:null

## 2022-02-20 NOTE — PROGRESS NOTE ADULT - PROBLEM SELECTOR PLAN 5
wbc 20 with increased neutrophils, thrombocytosis. Tachycardia. Most likely 2/2 infection given hx malaise and antibiotics course i/s/o Rectovaginal fistula (possible genitourinary infection) versus new primary malignancy (CT with new RLL opacities)  ECG wnl  Plan:  - c/w Empiric Zosyn  --- f/u blood Cx - currently NGTD   - f/u Urine sample, though will likely be contaminated  - f/u fever curve, if febrile repeat cultures  - Encourage po intake

## 2022-02-20 NOTE — PROGRESS NOTE ADULT - SUBJECTIVE AND OBJECTIVE BOX
PROGRESS NOTE:   Written by Thomas Alexander PGY-2    Patient is a 81y old  Female who presents with a chief complaint of LE Swelling (19 Feb 2022 00:44)      SUBJECTIVE / OVERNIGHT EVENTS:    ADDITIONAL REVIEW OF SYSTEMS:    MEDICATIONS  (STANDING):  heparin   Injectable 5000 Unit(s) SubCutaneous every 8 hours  iron sucrose IVPB 200 milliGRAM(s) IV Intermittent every 24 hours  piperacillin/tazobactam IVPB.. 3.375 Gram(s) IV Intermittent every 8 hours    MEDICATIONS  (PRN):      CAPILLARY BLOOD GLUCOSE        I&O's Summary    19 Feb 2022 07:01  -  20 Feb 2022 07:00  --------------------------------------------------------  IN: 920 mL / OUT: 0 mL / NET: 920 mL        PHYSICAL EXAM:  Vital Signs Last 24 Hrs  T(C): 36.7 (20 Feb 2022 05:08), Max: 37 (19 Feb 2022 20:32)  T(F): 98.1 (20 Feb 2022 05:08), Max: 98.6 (19 Feb 2022 20:32)  HR: 91 (20 Feb 2022 05:08) (91 - 109)  BP: 120/68 (20 Feb 2022 05:08) (111/60 - 120/68)  BP(mean): --  RR: 16 (20 Feb 2022 05:08) (16 - 20)  SpO2: 98% (20 Feb 2022 05:08) (97% - 99%)    GENERAL: No acute distress, resting in bed  HEAD:  Atraumatic, Normocephalic  EYES: EOMI, PERRLA, conjunctiva and sclera clear  NECK: Supple, no lymphadenopathy, no JVD  CHEST/LUNG: CTAB; No wheezes, rales, or rhonchi, breathing comfortably on NC  HEART: Regular rate and rhythm; No murmurs,  ABDOMEN: Soft, non-tender, non-distended; normal bowel sounds  EXTREMITIES:  2+ peripheral pulses b/l, 2+ LE edema b/l  NEUROLOGY: A&O x 3   SKIN: No rashes or lesions    LABS:                        7.1    22.66 )-----------( 622      ( 19 Feb 2022 07:21 )             23.8     02-20    137  |  105  |  18  ----------------------------<  81  4.9   |  19<L>  |  1.44<H>    Ca    8.4      20 Feb 2022 07:47  Phos  4.3     02-20  Mg     2.0     02-20    TPro  6.3  /  Alb  2.0<L>  /  TBili  0.3  /  DBili  x   /  AST  41<H>  /  ALT  29  /  AlkPhos  76  02-20              Culture - Blood (collected 17 Feb 2022 13:12)  Source: .Blood Blood-Peripheral  Preliminary Report (18 Feb 2022 14:01):    No growth to date.    Culture - Blood (collected 17 Feb 2022 13:12)  Source: .Blood Blood  Preliminary Report (18 Feb 2022 14:01):    No growth to date.        RADIOLOGY & ADDITIONAL TESTS:  Results Reviewed:   Imaging Personally Reviewed:  Electrocardiogram Personally Reviewed:    COORDINATION OF CARE:  Care Discussed with Consultants/Other Providers [Y/N]:  Prior or Outpatient Records Reviewed [Y/N]:   PROGRESS NOTE:   Written by Thomas Alexander PGY-2    Patient is a 81y old  Female who presents with a chief complaint of LE Swelling (19 Feb 2022 00:44)      SUBJECTIVE / OVERNIGHT EVENTS:    ADDITIONAL REVIEW OF SYSTEMS:    MEDICATIONS  (STANDING):  heparin   Injectable 5000 Unit(s) SubCutaneous every 8 hours  iron sucrose IVPB 200 milliGRAM(s) IV Intermittent every 24 hours  piperacillin/tazobactam IVPB.. 3.375 Gram(s) IV Intermittent every 8 hours    MEDICATIONS  (PRN):      CAPILLARY BLOOD GLUCOSE        I&O's Summary    19 Feb 2022 07:01  -  20 Feb 2022 07:00  --------------------------------------------------------  IN: 920 mL / OUT: 0 mL / NET: 920 mL        PHYSICAL EXAM:  Vital Signs Last 24 Hrs  T(C): 36.7 (20 Feb 2022 05:08), Max: 37 (19 Feb 2022 20:32)  T(F): 98.1 (20 Feb 2022 05:08), Max: 98.6 (19 Feb 2022 20:32)  HR: 91 (20 Feb 2022 05:08) (91 - 109)  BP: 120/68 (20 Feb 2022 05:08) (111/60 - 120/68)  BP(mean): --  RR: 16 (20 Feb 2022 05:08) (16 - 20)  SpO2: 98% (20 Feb 2022 05:08) (97% - 99%)    GENERAL: No acute distress, resting in bed  HEAD:  Atraumatic, Normocephalic  EYES: EOMI, PERRLA, conjunctiva and sclera clear  NECK: Supple, no lymphadenopathy, no JVD  CHEST/LUNG: CTAB; No wheezes, rales, or rhonchi, breathing comfortably on NC  HEART: Regular rate and rhythm; No murmurs,  ABDOMEN: Soft, non-tender, non-distended; normal bowel sounds  EXTREMITIES:  2+ peripheral pulses b/l, 2+ LE edema b/l  NEUROLOGY: A&O x 3   SKIN: + RLE skin tear    LABS:                        7.1    22.66 )-----------( 622      ( 19 Feb 2022 07:21 )             23.8     02-20    137  |  105  |  18  ----------------------------<  81  4.9   |  19<L>  |  1.44<H>    Ca    8.4      20 Feb 2022 07:47  Phos  4.3     02-20  Mg     2.0     02-20    TPro  6.3  /  Alb  2.0<L>  /  TBili  0.3  /  DBili  x   /  AST  41<H>  /  ALT  29  /  AlkPhos  76  02-20              Culture - Blood (collected 17 Feb 2022 13:12)  Source: .Blood Blood-Peripheral  Preliminary Report (18 Feb 2022 14:01):    No growth to date.    Culture - Blood (collected 17 Feb 2022 13:12)  Source: .Blood Blood  Preliminary Report (18 Feb 2022 14:01):    No growth to date.        RADIOLOGY & ADDITIONAL TESTS:  Results Reviewed:   Imaging Personally Reviewed:  Electrocardiogram Personally Reviewed:    COORDINATION OF CARE:  Care Discussed with Consultants/Other Providers [Y/N]:  Prior or Outpatient Records Reviewed [Y/N]:   PROGRESS NOTE:   Written by Thomas Alexander PGY-2    Patient is a 81y old  Female who presents with a chief complaint of LE Swelling (19 Feb 2022 00:44)      SUBJECTIVE / OVERNIGHT EVENTS: No acute events overnight. Patient resting comfortably on NC. Patient reports leg swelling, but denies active chest pain, shortness of breath, abdominal pain, fevers, chills, constipation, diarrhea, or leg pain      MEDICATIONS  (STANDING):  heparin   Injectable 5000 Unit(s) SubCutaneous every 8 hours  iron sucrose IVPB 200 milliGRAM(s) IV Intermittent every 24 hours  piperacillin/tazobactam IVPB.. 3.375 Gram(s) IV Intermittent every 8 hours    MEDICATIONS  (PRN):      CAPILLARY BLOOD GLUCOSE        I&O's Summary    19 Feb 2022 07:01  -  20 Feb 2022 07:00  --------------------------------------------------------  IN: 920 mL / OUT: 0 mL / NET: 920 mL        PHYSICAL EXAM:  Vital Signs Last 24 Hrs  T(C): 36.7 (20 Feb 2022 05:08), Max: 37 (19 Feb 2022 20:32)  T(F): 98.1 (20 Feb 2022 05:08), Max: 98.6 (19 Feb 2022 20:32)  HR: 91 (20 Feb 2022 05:08) (91 - 109)  BP: 120/68 (20 Feb 2022 05:08) (111/60 - 120/68)  BP(mean): --  RR: 16 (20 Feb 2022 05:08) (16 - 20)  SpO2: 98% (20 Feb 2022 05:08) (97% - 99%)    GENERAL: No acute distress, resting in bed  HEAD:  Atraumatic, Normocephalic  EYES: EOMI, PERRLA, conjunctiva and sclera clear  NECK: Supple, no lymphadenopathy, no JVD  CHEST/LUNG: CTAB; No wheezes, rales, or rhonchi, breathing comfortably on NC  HEART: Regular rate and rhythm; No murmurs,  ABDOMEN: Soft, non-tender, non-distended; normal bowel sounds  EXTREMITIES:  2+ peripheral pulses b/l, 2+ LE edema b/l  NEUROLOGY: A&O x 3   SKIN: + RLE skin tear    LABS:                        7.1    22.66 )-----------( 622      ( 19 Feb 2022 07:21 )             23.8     02-20    137  |  105  |  18  ----------------------------<  81  4.9   |  19<L>  |  1.44<H>    Ca    8.4      20 Feb 2022 07:47  Phos  4.3     02-20  Mg     2.0     02-20    TPro  6.3  /  Alb  2.0<L>  /  TBili  0.3  /  DBili  x   /  AST  41<H>  /  ALT  29  /  AlkPhos  76  02-20              Culture - Blood (collected 17 Feb 2022 13:12)  Source: .Blood Blood-Peripheral  Preliminary Report (18 Feb 2022 14:01):    No growth to date.    Culture - Blood (collected 17 Feb 2022 13:12)  Source: .Blood Blood  Preliminary Report (18 Feb 2022 14:01):    No growth to date.        RADIOLOGY & ADDITIONAL TESTS:  Results Reviewed:   Imaging Personally Reviewed:  Electrocardiogram Personally Reviewed:    COORDINATION OF CARE:  Care Discussed with Consultants/Other Providers [Y/N]:  Prior or Outpatient Records Reviewed [Y/N]:

## 2022-02-20 NOTE — PROGRESS NOTE ADULT - ASSESSMENT
82 y/o F [employer] hx of asthma, uterine prolapse req hysterectomy c/b rectovaginal fistula p/w lower extremity swelling and lethargy, found to have DVT, w/t CT  chest, abdomen, pelvis,  imaging consistent with lung malignancy.  82 y/o F [employer] hx of asthma, uterine prolapse req hysterectomy c/b rectovaginal fistula p/w lower extremity swelling and lethargy, found to have DVT, w/t CT  chest, abdomen, pelvis,  imaging consistent with lung malignancy.

## 2022-02-20 NOTE — PROGRESS NOTE ADULT - ATTENDING COMMENTS
Bilateral DVT , below the knee, in setting of malignancy   Would really want to see if there is a PE - would  re:  a/c dosing (full vs ppx) vs IVC filter  Primary team to discuss GOC with family   Jehova's witness, now with anemia.    Keep on DVT ppx for now  V/Q scan wont be accurate in this setting     Brooke [phone]
Patient seen and evaluated with daughter at bedside. I spent 55 minutes discussing plan of care with daughter and patient, discussing DVT and need for A/C, risk of bleed in setting of anemia and Hindu, mass that we would like ot biopsy and leukocytosis we would want to pursue.  I discussed my plan of action would be to have Gynecology evaluate patient for bleed from fistula and if any treatment options could be offered. 2. Recommended to monitor in hospital on 48 hours on heparin gtt to monitor for bleeding and hten transition to DOAC if possible, OR get CTA and if clot burden can offer IVC given anemia, jehovas witness. I offered to do an vaginal exam to examine the fistula but the patient and family refused.   Paitent and daughter were upset with care of patient, stated she was not bathed, did not eat for the first day.  They are ademant about leaving the hospital today.  They are aware of the risk of PE and death if leaving without anticoagulation as well as the risk of bleed and death if go out on anticoagulation without close monitoring.  I decided at the end of discussion to NOT send out on A/C as without monitoring her and with her hemoglobin of 6.8, there was a high risk of bleed (no colonoscopy, iron deficiency, hemoglobin 6.8 and patient not accepting of hemoglobin 2/2 Hindu.    Patient does have follow up with her PMD on Monday.   -I offered oncology number, patient's daughter states will likely go to [place] for treatment.   -Daughter states she will follow up with a gynecologist for chronic fistula (declined surgery 2 years ago 2/2 risks of procedure)  -was previously on flagyl for sinusitis, so completed 8 days of anaerobic coverage. will send out on 4 days of cipro to complete 7 day empiric course to cover GI/ organisms given fistula.    Given multiple risks patient will have to leave AMA.      d/c planning 55 minutes.
Patient seen and evaluated. Patient on O2, but denies any other complaints. I again discussed with her r/b/a of full dose anticoagulation for DVT, patient is refusing at this time.  Also discussed lung mass that is concerning for malignancy and the desire to biopys to confirm diagnosis and therefore offer possible treatments, which the patient also refuses. Stating that sitting in hospital is what is most detrimental to her care. Awaiting family to come in, where I will also discuss r/b.  Hep SQ is not standard of care for known DVT and is not a viable option to go home on, so will need to discuss with patient and family r/b/a of no Ac vs. offer DOAC as possible option. If does not want treatment, will also offer palliative options including hospice.
81F Jehovah Witness pw LE swelling and weakness  Imaging suspicious for primary lung ca  Also with new microcytic anemia  Found to have bilateral DVT  Patient refusing further testing or Rx pending GOC discussion with family  Myself and Dr. Curran met w/ pt and daughter at bedside  Discussed current findings (lung masses, DVTs, and anemia) and treatment plan at length  We agreed on starting ppx hsq to prevent dvt propagation  We discussed CTPE as best test for clarifying if PE is present, and if so that AC would be firmly indicated - Per daughter not amenable to CTPE w/ pretreatment for allergy and not amenable for VQ scan at this time  We discussed only monitoring w/ repeat doppler  We discussed need for bx to know definitive diagnosis or treatment options for lung mass -- family wants to consider further before proceeding  Will need ongoing GOC discussions  Per vasc cards best management for DVT really depends on whether PE is present, and VQ unlikely to be helpful given underlying lung mass. Thus will need to readdress CTPE with pretreatment for contrast allergy  Monitor anemia-started IV iron given low iron sat  Unclear at this time how family wants to approach lung ca. If after further GOC still declining workup, can likely follow up outpt

## 2022-03-12 NOTE — ED ADULT NURSE REASSESSMENT NOTE - NS ED NURSE REASSESS COMMENT FT1
spoke to patient's daughter regarding any concerns she may have due to her hesitancy for surgery and medical interventions. explained the necessity for obtaining an accurate temperature on the patient and necessity for continuous BP monitoring while patient is on Levophed. daughter stated "I understand the decisions I am making and it is on me". "I just need more time, I want to see if the medications will work". witnessed attending physician Dr. Moy explain that the patient can die without the immediately necessary medical interventions, including surgery, and family verbalized understanding. pt awaiting MICU consult. Mike SANTIZO remains at bedside. Briana Werner, RN, ANM.

## 2022-03-12 NOTE — ED PROVIDER NOTE - OBJECTIVE STATEMENT
82 y/o F, [employer], PMH of anemia, hysterectomy c/b rectovaginal fistula, and B/L DVTs (on xarelto), BIBEMS to ED today for AMS. Per daughter, pt lives alone, has been more confused the last few days, and today was found down at home and minimally responsive. Per EMS, pt was hypotensive to SBP 80s, and was unable to obtain O2 sat and temp in triage. Of note, daughter states pt has Rt foot ulcer that "never healed" after she scratched it 2 weeks ago. Per daughter, pt accidentally burned it with hot water a few days ago, so they attempted to cool it by wrapping it with a towel. Upon unwrapping towel, RLE is swollen, cold to touch, odourous, and erythematous w/ hemorrhagic bullae.

## 2022-03-12 NOTE — ED PROCEDURE NOTE - CPROC ED TIME OUT STATEMENT1
“Patient's name, , procedure and correct site were confirmed during the [facility] Timeout.” 14-Oct-2018 21:39

## 2022-03-12 NOTE — ED ADULT NURSE REASSESSMENT NOTE - CONDITION
received pt in room 11 with do jones.  pt sada.. minimally responsive.  levophed drip in progress.   u/a to obtain rectal temp and family refusing criticore fc. as per endorsement.  do jeong with pt.  given meds as ordered.  pt became pulseless at approx 9:05. code blue called.  see cardiac arrest record.  rosc obtained.  intiubated with 7.5 ett.  levophed drip increased.  report given to karen harris and rosalind.  pt tfr'ed to icu with rn, edt, and respiratory therapistt while on zoll.

## 2022-03-12 NOTE — ED PROVIDER NOTE - PROGRESS NOTE DETAILS
Brandee Pires PGY2: Surgery consulted and evaluated pt. Surgery believes pt will need to go to OR immediately for wash out and possible amputation of RLE. However, pt family is refusing surgical management and would like to continue medical management to discuss options. There was lengthy discuss about the severity of disease, and that the only option for survival would likely be surgical management as soon as possible. Family expresses understanding, but is still opting to defer surgery at this time. The discussion of comfort care was also brought up given this, however daughter states pt is full code, and they will not consider palliative care at this time. Given this will consult micu for disposition given pt is requiring levophed for pressure support. Brianda, PGY3: pt received 2L fluid bolus, hypotensive, pressors started, pt to go tba MICU Brandee Pires PGY2/Tucker Moy Attending:  DATA:  EKG: pending at time of evaluation  LAB:   Blood Gas Venous - Lactate: 7.2 mmol/L (03-12-22 @ 17:00)                        5.4    8.35  )-----------( 427      ( 12 Mar 2022 17:01 )             17.9   Mean Cell Volume: 81.0 fL (03-12-22 @ 17:01)  Auto Neutrophil %: 74.5 % (03-12-22 @ 17:01)  Auto Eosinophil %: 0.0 % (03-12-22 @ 17:01)  PT: 43.6 sec;   INR: 3.71 ratio    PTT:39.3 [id number]    137  |  99  |  110<H>  ----------------------------<  127<H>  7.1<HH>   |  8<LL>  |  6.26<H>    Ca    8.7      12 Mar 2022 17:06    TPro  5.8<L>  /  Alb  2.6<L>  /  TBili  0.2  /  DBili  x   /  AST  30  /  ALT  56<H>  /  AlkPhos  108  03-12    Surgery consulted and evaluated pt. Surgery believes pt will need to go to OR immediately for wash out and possible amputation of RLE. However, pt family is refusing surgical management and would like to continue medical management to discuss options. There was lengthy discuss about the severity of disease, and that the only option for survival would likely be surgical management as soon as possible. Family expresses understanding, but is still opting to defer surgery at this time. The discussion of comfort care was also brought up given this, however daughter states pt is full code, and they will not consider palliative care at this time. Given this will consult micu for disposition given pt is requiring levophed for pressure support.

## 2022-03-12 NOTE — CHART NOTE - NSCHARTNOTEFT_GEN_A_CORE
Nephrology consultation requested for renal failure, hyperkalemia, lactic / metabolic acidosis in the setting of septic shock secondary to necrotizing fascitis.     Reviewed labs / vitals and notes.                           5.4    8.35  )-----------( 427      ( 12 Mar 2022 17:01 )             17.9     03-12    137  |  99  |  110<H>  ----------------------------<  127<H>  7.1<HH>   |  8<LL>  |  6.26<H>    Ca    8.7      12 Mar 2022 17:06    TPro  5.8<L>  /  Alb  2.6<L>  /  TBili  0.2  /  DBili  x   /  AST  30  /  ALT  56<H>  /  AlkPhos  108  03-12    pH 7.05 with lactate of 7.2.      Pt. currently hemodynamically too unstable to tolerate any form of RRT. (SBP ~70s-80s) with Hb of 5.4.    Pt. is a [employer], however family considering blood transfusion at present.  Recommend medical management for now until hemodynamic status improves.    Pt. received 2 amps of bicarb in ER. Start bicarb gtt ( 150 mEQ of NaHCO3 in 1L D5W) at 100cc/hr.   Continue medical management for hyperkalemia (Lokelma 10GM TID, Calcium Gluconate).  Pickering catheterization to maintain I and Os. Diuretic trial once volume replete / hemodynamics improve.  CHECK CK levels to assess for Rhabdomyolysis.  Monitor pH and serum CO2.    Agree with GOC discussion as recommended by Vascular surgery if pt. is not a candidate for surgical intervention.  Will need to consider CRRT if GOC remain unclear, however would prefer MAP of > 60 with vasopressor support.    Overall prognosis guarded.   Discussed with on-call attending.    Greg Rubio  Nephrology Fellow    Pager [phone]  (After 5pm or on weekends please page the on-call fellow)
Vascular Surgery Fellow    I spoke to the patient's son and daughter initially over phone and then again in person. Briefly this is an 81F with a necrotic right lower extremity. She presents today in septic shock.     I spoke on the phone to both of them stating unfortunately her chances of mortality with or without an operation are very high and that I did not think this was a survivable situation. Her H/H of 5/17, lactate of 7, INR of 3.7 and postassium of 7.1 put her at extremely high risk of perioperative mortality. I recommended palliative and comfort care, to which her son is amenable but her daughter continues to want medical management.    At this moment she is not a surgical candidate given her metabolic derangements  D/w Dr. Caden Newsome PGY 6

## 2022-03-12 NOTE — ED ADULT NURSE REASSESSMENT NOTE - NS ED NURSE REASSESS COMMENT FT1
Facilitator RN: Pt currently in position of comfort, neg SOB, a&ox1. Rectal temp attempted 3x, not registering on thermometer, pt's family refusing hui catheter with temperature probe. Dr. Moy aware. Pt wishes to provide perineal care for pt, requesting that staff not be in room. Family removed BP cuff, blood pressures not obtained at that period of time. Pt's family educated on risks, verbalized understanding. Bilateral 18g U/S-guided IVs placed by E.D. resident, lab work collected and sent to lab. MICU provider at bedside discussing plan and abnormal lab results. Family currently refusing plan for surgery. Frequent rounding in place. Report given to primary RN Marilynn.

## 2022-03-12 NOTE — H&P ADULT - ASSESSMENT
80 y/o F [place] hx of asthma, uterine prolapse req hysterectomy c/b rectovaginal fistula and b/l DVT presents not on AC presents w/ b/l LE wounds now in septic shock 2/2 likely necrotizing fasciitis c/b acute renal failure.    #Neuro  Metabolic encephalopathy 2/2 septic shock d/t necrotizing fasciitis vs ARF   -AAOx3 at baseline; AAOx 1 on presentation   -Treat as below    #Resp  Patient currently protecting airway, satting well but tenouous clinical picture may require intubation  -CTM    #CV  Septic shock 2/2 necrotizing fasciitis   -resuscitated 30cc/kg of LR  - now on Levo w/ MAP goal >65    #GI  NPO for now  -PHILIPPE    #ID  Septic shock 2/2 necrotizing fascitis of b/l LE; received vanc/clinda empirically in ED  -c/w empiric abx  -send blood/urine cx  -Source control not possible as patient not a surgical candidate    #Renal  ARF  Baseline cr 1 w/ increase to 6.2 likely 2/2 septic shock now w/ hyperkalemia and profound metabolic acidosis w/ pH of 7.1  -nephro c/s for HD if candidate  -Monitor UOP  -Monitor lytes    #Heme  Patient Jehova's witness w/ Hb 5.6 w/ family initially refusing PRBCs  - family on importance of interventions    #Endo  FS wnl  -PHILIPPE    #DVT PPx  -SCDs  -Will hold chemical ppx in setting of profound anemia     #GOC/Ethics  Extensive GOC had with daughter and son at bedside who lack understanding of full clinical picture and poor prognosis. Daughter states "do whatever you have to do to keep her alive" but deny most life preserving measures aside from compressions and peripheral vasopressors. Ongoing GOC to be had regarding escalation of care.

## 2022-03-12 NOTE — H&P ADULT - NSHPLABSRESULTS_GEN_ALL_CORE
.  LABS:                         5.4    8.35  )-----------( 427      ( 12 Mar 2022 17:01 )             17.9     03-12    137  |  99  |  110<H>  ----------------------------<  127<H>  7.1<HH>   |  8<LL>  |  6.26<H>    Ca    8.7      12 Mar 2022 17:06    TPro  5.8<L>  /  Alb  2.6<L>  /  TBili  0.2  /  DBili  x   /  AST  30  /  ALT  56<H>  /  AlkPhos  108  03-12    PT/INR - ( 12 Mar 2022 17:01 )   PT: 43.6 sec;   INR: 3.71 ratio         PTT - ( 12 Mar 2022 17:01 )  PTT:39.3 sec          RADIOLOGY, EKG & ADDITIONAL TESTS: Reviewed.

## 2022-03-12 NOTE — ED ADULT TRIAGE NOTE - CHIEF COMPLAINT QUOTE
Pt brought in by EMS for altered mental status. As per daughter, pt was found on the floor at home today and pt has been more confused over the past few days. EMS states pt was hypotensive on scene, systolic bp in the 80's. Daughter states pt has right leg ulcer currently being treated with antibiotics. Unable to obtain O2 saturation and temperature in triage.
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
## 2022-03-12 NOTE — ED PROVIDER NOTE - CLINICAL SUMMARY MEDICAL DECISION MAKING FREE TEXT BOX
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 Found to have RLE wound concerning for necrotizing fascitis. Per daughter pt is full code.  Will draw labs and cultures, send for XR of RLE, and start clindamycin, zosyn, and vancomycin. Immediate surgical consult. Low threshold for starting vasopressor support given hypotension. Likely will need to go to OR.

## 2022-03-12 NOTE — CONSULT NOTE ADULT - ATTENDING COMMENTS
Vascular surgery consulted for infection source control in this 81F with necrotic right lower extremity. 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 Additionally, potassium currently 7.1., lactate 7.2. She is high risk for ashlie-operative mortality given the above findings. Of note previously patient was diagnosed with bilateral pulmonary nodules concerning for malignancy and she refused any workup/intervention for this. She is not currently an operative candidate. Palliative/comfort care recommended. If metabolic derangements can be corrected and family agreeable to blood products/INR reversal, we will reassess patient for surgical intervention.

## 2022-03-12 NOTE — PATIENT PROFILE ADULT - FALL HARM RISK - UNIVERSAL INTERVENTIONS
Bed in lowest position, wheels locked, appropriate side rails in place/Call bell, personal items and telephone in reach/Instruct patient to call for assistance before getting out of bed or chair/Non-slip footwear when patient is out of bed/[place] to call system/Physically safe environment - no spills, clutter or unnecessary equipment/Purposeful Proactive Rounding/Room/bathroom lighting operational, light cord in reach

## 2022-03-12 NOTE — ED PROVIDER NOTE - PHYSICAL EXAMINATION
PHYSICAL EXAM:  GENERAL: toxic appearing; in no respiratory distress  HEENT: Atraumatic, Normocephalic, PERRL  NECK: No JVD; FROM  CHEST/LUNG: CTAB no wheezes/rhonchi/rales  HEART: bradycardic, no murmur/gallops/rubs  ABDOMEN: +BS, soft, NT, ND  EXTREMITIES: +2 radial pulses b/l, unable to obtain DP/PT pulses of RLE; RLE edematous, cold to touch, malodorous, and erythematous w/ hemorrhagic bullae/purpura and crepitus; LLE w/ punctate petechiae   NERVOUS SYSTEM:  A&Ox0, unable to cooperative for neuro exam

## 2022-03-12 NOTE — CONSULT NOTE ADULT - ASSESSMENT
81F presenting with RLE medial leg wound concerning for necrotizing soft tissue infection.     Extensive discussion had with both daughter and son at bedside on multiple occasions regarding the severity of their mothers illness.  Due to extent of disease the only option for survival would be an RLE amputation.  This was discussed at length with both the son and daughter who state understanding but requested more time to discuss between themselves.  I explained to both of them that due to the severity of illness an operation for adequate source control was the only option for survival and that this decision was time sensitive which they also stated understanding to.  I explained that due to her current status even if we did operate she was at high risk for not tolerating surgery.  Daughter and Son both ultimately decided to not pursue surgery.  The option of comfort care was brought up to them by both myself, Vascular Fellow and ED attending physician to which daughter stated she does not want palliative or surgery, she wants medical care.      Due to extensive nature of disease, patient likely could not tolerate surgery at this time  No vascular surgery intervention  Medical care/palliative if family amenable  Please page with questions or concerns  Discussed with Fellow    Vascular surgery  [id number] 81F presenting with RLE medial leg wound concerning for necrotizing soft tissue infection.     Extensive discussion had with both daughter and son at bedside on multiple occasions regarding the severity of their mothers illness.  I explained that due to her current status even if we did operate she was at high risk for not tolerating surgery.  Daughter and Son both ultimately decided to not pursue surgery.  The option of comfort care was brought up to them by both myself, Vascular Fellow and ED attending physician to which daughter stated she does not want palliative or surgery, she wants medical care.      Due to extensive nature of disease, patient likely could not tolerate surgery at this time  No vascular surgery intervention  Medical care/palliative if family amenable  Please page with questions or concerns  Discussed with Fellow    Vascular surgery  [id number]
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
## 2022-03-12 NOTE — H&P ADULT - NSHPPHYSICALEXAM_GEN_ALL_CORE
PHYSICAL EXAM:  General: ill appearing   HEENT: neck supple, anicteric sclera  Cardiovascular: Normal s1, s2, RRR  Respiratory: CTA b/l   Abdominal: Soft, ntnd  Extremities: necrotic b/l LE including toes up to metatarsals with skin tearing on shins  Neurologic: Non focal  Psych: Awake, AAOx1 reportedly AAOx3 at baseline
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
## 2022-03-12 NOTE — ED ADULT NURSE REASSESSMENT NOTE - NS ED NURSE REASSESS COMMENT FT1
Additional 18g IV placed to L forearm via U/S by E.D. resident. IV meds administered late due to limited IV access. Dr. Moy aware. Report given to oncoming RN.

## 2022-03-12 NOTE — H&P ADULT - ATTENDING COMMENTS
82 y/o F [employer] hx of asthma, uterine prolapse req hysterectomy c/b rectovaginal fistula and b/l DVT presents not on AC presents w/ b/l LE wounds now in septic shock 2/2 likely necrotizing fasciitis c/b acute renal failure and severe acidosis  pt s/p cardiac arrest in the ED, s/p 3 epi (12min down time)  vent support, ABG  pancx, Abx. [id number]   overall guarded prognosis, multisystem organ failure in the setting of septic shock likely 2/2 nec fascitis, pt seen and examined by vascular, not a surgical candidate, high likelihood of mortality, family made aware

## 2022-03-12 NOTE — ED ADULT NURSE REASSESSMENT NOTE - NS ED NURSE REASSESS COMMENT FT1
Pt with witnessed cardiac arrest at 2105. Compressions started by float RN, RRT RN and primary RN called to bedside. See code flowsheet for further details. Intubated 7.5, 25 at the lipline at 2111. ROSC obtained at 2115. Levophed resumed at 0.3mcg. RRT RN Rajendra assuming care. Updates to be provided to MICU.

## 2022-03-13 PROBLEM — N82.3 FISTULA OF VAGINA TO LARGE INTESTINE: Chronic | Status: ACTIVE | Noted: 2022-01-01

## 2022-03-13 PROBLEM — J45.909 UNSPECIFIED ASTHMA, UNCOMPLICATED: Chronic | Status: ACTIVE | Noted: 2022-01-01

## 2022-03-13 NOTE — DISCHARGE NOTE FOR THE EXPIRED PATIENT - HOSPITAL COURSE
81F p/w septic shock, acute renal failure, lactic acidosis, hyperkalemia 2/2 necrotizing fasciitis RLE. Patient rapidly decompensated and experienced cardiac arrest in ED, ROSC achieved after 12 minutes Patient never hemodynamically stabilized post-ROSC and subsequently experienced cardiac arrest again due to multi organ dysfunction, and CPR was deferred after discussions with family at bedside. Pronounced dead 3/13/2022 at 00:48.

## 2022-03-13 NOTE — DISCHARGE NOTE FOR THE EXPIRED PATIENT - SECONDARY DIAGNOSIS.
Anemia Lactic acidosis MARIA DEL CARMEN (acute kidney injury) Cardiopulmonary arrest Cardiopulmonary arrest with successful resuscitation

## 2022-03-17 LAB
CULTURE RESULTS: SIGNIFICANT CHANGE UP
CULTURE RESULTS: SIGNIFICANT CHANGE UP
SPECIMEN SOURCE: SIGNIFICANT CHANGE UP
SPECIMEN SOURCE: SIGNIFICANT CHANGE UP

## 2025-02-02 NOTE — PROGRESS NOTE ADULT - PROBLEM SELECTOR PROBLEM 6
D/C: Order noted for d/c. Pt confirmed d/c paperwork  have correct name. Discharge and education instructions reviewed with patient. Teach-back successful.  Pt verbalized understanding. Pt denied questions at this time. No acute distress noted. Patient instructed to follow-up as noted - return to emergency department if symptoms worsen. Patient verbalized understanding. Discharged per EDMD with discharge instructions. Pt discharged  to private vehicle. Patient stable upon departure. Thanked patient for choosing [facility] for care.        Abnormal TSH

## 2025-05-12 NOTE — PROGRESS NOTE ADULT - PROBLEM SELECTOR PROBLEM 6
Rectovaginal fistula Abnormal TSH